# Patient Record
Sex: FEMALE | Race: WHITE | NOT HISPANIC OR LATINO
[De-identification: names, ages, dates, MRNs, and addresses within clinical notes are randomized per-mention and may not be internally consistent; named-entity substitution may affect disease eponyms.]

---

## 2017-01-28 ENCOUNTER — FORM ENCOUNTER (OUTPATIENT)
Age: 62
End: 2017-01-28

## 2017-01-29 ENCOUNTER — OUTPATIENT (OUTPATIENT)
Dept: OUTPATIENT SERVICES | Facility: HOSPITAL | Age: 62
LOS: 1 days | End: 2017-01-29
Payer: COMMERCIAL

## 2017-01-29 ENCOUNTER — APPOINTMENT (OUTPATIENT)
Dept: NUCLEAR MEDICINE | Facility: IMAGING CENTER | Age: 62
End: 2017-01-29

## 2017-01-29 DIAGNOSIS — C43.59 MALIGNANT MELANOMA OF OTHER PART OF TRUNK: ICD-10-CM

## 2017-01-29 PROCEDURE — A9552: CPT

## 2017-01-29 PROCEDURE — 78816 PET IMAGE W/CT FULL BODY: CPT

## 2017-02-22 ENCOUNTER — FORM ENCOUNTER (OUTPATIENT)
Age: 62
End: 2017-02-22

## 2017-02-23 ENCOUNTER — APPOINTMENT (OUTPATIENT)
Dept: NUCLEAR MEDICINE | Facility: IMAGING CENTER | Age: 62
End: 2017-02-23

## 2017-02-23 ENCOUNTER — OUTPATIENT (OUTPATIENT)
Dept: OUTPATIENT SERVICES | Facility: HOSPITAL | Age: 62
LOS: 1 days | End: 2017-02-23
Payer: COMMERCIAL

## 2017-02-23 DIAGNOSIS — C43.59 MALIGNANT MELANOMA OF OTHER PART OF TRUNK: ICD-10-CM

## 2017-02-23 PROCEDURE — 78306 BONE IMAGING WHOLE BODY: CPT

## 2017-02-23 PROCEDURE — 78999 UNLISTED MISC PX DX NUC MED: CPT

## 2017-02-23 PROCEDURE — A9561: CPT

## 2017-03-28 ENCOUNTER — CHART COPY (OUTPATIENT)
Age: 62
End: 2017-03-28

## 2017-03-28 ENCOUNTER — APPOINTMENT (OUTPATIENT)
Dept: PULMONOLOGY | Facility: CLINIC | Age: 62
End: 2017-03-28

## 2017-03-28 VITALS
HEIGHT: 70 IN | SYSTOLIC BLOOD PRESSURE: 160 MMHG | DIASTOLIC BLOOD PRESSURE: 90 MMHG | RESPIRATION RATE: 16 BRPM | WEIGHT: 157 LBS | TEMPERATURE: 98.6 F | BODY MASS INDEX: 22.48 KG/M2 | HEART RATE: 89 BPM

## 2017-03-29 LAB
ALBUMIN SERPL ELPH-MCNC: 4.5 G/DL
ALP BLD-CCNC: 90 U/L
ALT SERPL-CCNC: 13 U/L
ANION GAP SERPL CALC-SCNC: 19 MMOL/L
AST SERPL-CCNC: 26 U/L
BASOPHILS # BLD AUTO: 0.03 K/UL
BASOPHILS NFR BLD AUTO: 0.4 %
BILIRUB SERPL-MCNC: 0.4 MG/DL
BUN SERPL-MCNC: 16 MG/DL
CALCIUM SERPL-MCNC: 9.9 MG/DL
CHLORIDE SERPL-SCNC: 99 MMOL/L
CO2 SERPL-SCNC: 24 MMOL/L
CREAT SERPL-MCNC: 0.64 MG/DL
DEPRECATED KAPPA LC FREE/LAMBDA SER: 0.91 RATIO
EOSINOPHIL # BLD AUTO: 0.06 K/UL
EOSINOPHIL NFR BLD AUTO: 0.9 %
GLUCOSE SERPL-MCNC: 85 MG/DL
HCT VFR BLD CALC: 39.9 %
HGB BLD-MCNC: 12.8 G/DL
IGA SER QL IEP: 204 MG/DL
IGG SER QL IEP: 977 MG/DL
IGM SER QL IEP: 91 MG/DL
IMM GRANULOCYTES NFR BLD AUTO: 0.4 %
INR PPP: 0.9 RATIO
KAPPA LC CSF-MCNC: 1.37 MG/DL
KAPPA LC SERPL-MCNC: 1.25 MG/DL
LYMPHOCYTES # BLD AUTO: 1.19 K/UL
LYMPHOCYTES NFR BLD AUTO: 17 %
MAN DIFF?: NORMAL
MCHC RBC-ENTMCNC: 31.7 PG
MCHC RBC-ENTMCNC: 32.1 GM/DL
MCV RBC AUTO: 98.8 FL
MONOCYTES # BLD AUTO: 0.59 K/UL
MONOCYTES NFR BLD AUTO: 8.4 %
NEUTROPHILS # BLD AUTO: 5.11 K/UL
NEUTROPHILS NFR BLD AUTO: 72.9 %
PLATELET # BLD AUTO: 265 K/UL
POTASSIUM SERPL-SCNC: 4.3 MMOL/L
PROT SERPL-MCNC: 7.5 G/DL
PT BLD: 10.2 SEC
RBC # BLD: 4.04 M/UL
RBC # FLD: 12.4 %
SODIUM SERPL-SCNC: 142 MMOL/L
WBC # FLD AUTO: 7.01 K/UL

## 2017-03-30 LAB
IGG SUBSET TOTAL IGG: 1030 MG/DL
IGG1 SER-MCNC: 470 MG/DL
IGG2 SER-MCNC: 398 MG/DL
IGG3 SER-MCNC: 61.8 MG/DL
IGG4 SER-MCNC: 16.4 MG/DL

## 2017-04-11 ENCOUNTER — OUTPATIENT (OUTPATIENT)
Dept: OUTPATIENT SERVICES | Facility: HOSPITAL | Age: 62
LOS: 1 days | End: 2017-04-11
Payer: COMMERCIAL

## 2017-04-11 ENCOUNTER — APPOINTMENT (OUTPATIENT)
Dept: CT IMAGING | Facility: IMAGING CENTER | Age: 62
End: 2017-04-11

## 2017-04-11 DIAGNOSIS — R04.2 HEMOPTYSIS: ICD-10-CM

## 2017-04-11 DIAGNOSIS — A31.0 PULMONARY MYCOBACTERIAL INFECTION: ICD-10-CM

## 2017-04-11 PROCEDURE — 71260 CT THORAX DX C+: CPT

## 2017-05-02 ENCOUNTER — APPOINTMENT (OUTPATIENT)
Dept: PULMONOLOGY | Facility: CLINIC | Age: 62
End: 2017-05-02

## 2017-05-02 VITALS
BODY MASS INDEX: 22.48 KG/M2 | HEART RATE: 92 BPM | SYSTOLIC BLOOD PRESSURE: 150 MMHG | HEIGHT: 70 IN | TEMPERATURE: 97.9 F | RESPIRATION RATE: 16 BRPM | DIASTOLIC BLOOD PRESSURE: 90 MMHG | WEIGHT: 157 LBS | OXYGEN SATURATION: 98 %

## 2017-05-02 DIAGNOSIS — R04.2 HEMOPTYSIS: ICD-10-CM

## 2017-05-03 LAB
ALBUMIN SERPL ELPH-MCNC: 4.4 G/DL
ALP BLD-CCNC: 78 U/L
ALT SERPL-CCNC: 12 U/L
ANION GAP SERPL CALC-SCNC: 20 MMOL/L
APTT BLD: 30 SEC
AST SERPL-CCNC: 25 U/L
BASOPHILS # BLD AUTO: 0.02 K/UL
BASOPHILS NFR BLD AUTO: 0.3 %
BILIRUB SERPL-MCNC: 0.4 MG/DL
BUN SERPL-MCNC: 15 MG/DL
CALCIUM SERPL-MCNC: 9.9 MG/DL
CHLORIDE SERPL-SCNC: 102 MMOL/L
CO2 SERPL-SCNC: 21 MMOL/L
CREAT SERPL-MCNC: 0.73 MG/DL
EOSINOPHIL # BLD AUTO: 0.04 K/UL
EOSINOPHIL NFR BLD AUTO: 0.7 %
GLUCOSE SERPL-MCNC: 87 MG/DL
HCT VFR BLD CALC: 38.8 %
HGB BLD-MCNC: 12.6 G/DL
IMM GRANULOCYTES NFR BLD AUTO: 0.2 %
INR PPP: 0.91 RATIO
LYMPHOCYTES # BLD AUTO: 1.31 K/UL
LYMPHOCYTES NFR BLD AUTO: 21.4 %
MAN DIFF?: NORMAL
MCHC RBC-ENTMCNC: 32 PG
MCHC RBC-ENTMCNC: 32.5 GM/DL
MCV RBC AUTO: 98.5 FL
MONOCYTES # BLD AUTO: 0.63 K/UL
MONOCYTES NFR BLD AUTO: 10.3 %
NEUTROPHILS # BLD AUTO: 4.12 K/UL
NEUTROPHILS NFR BLD AUTO: 67.1 %
PLATELET # BLD AUTO: 250 K/UL
POTASSIUM SERPL-SCNC: 4.3 MMOL/L
PROT SERPL-MCNC: 7.4 G/DL
PT BLD: 10.3 SEC
RBC # BLD: 3.94 M/UL
RBC # FLD: 12.5 %
SODIUM SERPL-SCNC: 143 MMOL/L
WBC # FLD AUTO: 6.13 K/UL

## 2017-05-19 ENCOUNTER — APPOINTMENT (OUTPATIENT)
Dept: PULMONOLOGY | Facility: HOSPITAL | Age: 62
End: 2017-05-19

## 2017-05-19 ENCOUNTER — RESULT REVIEW (OUTPATIENT)
Age: 62
End: 2017-05-19

## 2017-05-19 ENCOUNTER — OUTPATIENT (OUTPATIENT)
Dept: OUTPATIENT SERVICES | Facility: HOSPITAL | Age: 62
LOS: 1 days | End: 2017-05-19
Payer: COMMERCIAL

## 2017-05-19 DIAGNOSIS — R04.2 HEMOPTYSIS: ICD-10-CM

## 2017-05-19 LAB
B PERT IGG+IGM PNL SER: ABNORMAL
COLOR FLD: SIGNIFICANT CHANGE UP
FLUID INTAKE SUBSTANCE CLASS: SIGNIFICANT CHANGE UP
FLUID SEGMENTED GRANULOCYTES: 36 % — SIGNIFICANT CHANGE UP
LYMPHOCYTES # FLD: 13 % — SIGNIFICANT CHANGE UP
MESOTHL CELL # FLD: 3 % — SIGNIFICANT CHANGE UP
MONOS+MACROS # FLD: 48 % — SIGNIFICANT CHANGE UP
NIGHT BLUE STAIN TISS: SIGNIFICANT CHANGE UP
RCV VOL RI: 60 /UL — HIGH (ref 0–5)
SPECIMEN SOURCE: SIGNIFICANT CHANGE UP
TOTAL NUCLEATED CELL COUNT, BODY FLUID: 187 /UL — HIGH (ref 0–5)
TUBE TYPE: SIGNIFICANT CHANGE UP

## 2017-05-19 PROCEDURE — 89051 BODY FLUID CELL COUNT: CPT

## 2017-05-19 PROCEDURE — 87206 SMEAR FLUORESCENT/ACID STAI: CPT

## 2017-05-19 PROCEDURE — 87070 CULTURE OTHR SPECIMN AEROBIC: CPT

## 2017-05-19 PROCEDURE — 87015 SPECIMEN INFECT AGNT CONCNTJ: CPT

## 2017-05-19 PROCEDURE — 87102 FUNGUS ISOLATION CULTURE: CPT

## 2017-05-19 PROCEDURE — 87798 DETECT AGENT NOS DNA AMP: CPT

## 2017-05-19 PROCEDURE — 31624 DX BRONCHOSCOPE/LAVAGE: CPT | Mod: RT

## 2017-05-19 PROCEDURE — 87116 MYCOBACTERIA CULTURE: CPT

## 2017-05-19 PROCEDURE — 88112 CYTOPATH CELL ENHANCE TECH: CPT

## 2017-05-19 PROCEDURE — 31624 DX BRONCHOSCOPE/LAVAGE: CPT | Mod: GC

## 2017-05-19 PROCEDURE — 88112 CYTOPATH CELL ENHANCE TECH: CPT | Mod: 26

## 2017-05-19 PROCEDURE — 88312 SPECIAL STAINS GROUP 1: CPT

## 2017-05-19 PROCEDURE — 88312 SPECIAL STAINS GROUP 1: CPT | Mod: 26

## 2017-05-19 PROCEDURE — 87556 M.TUBERCULO DNA AMP PROBE: CPT

## 2017-05-20 LAB
DEPRECATED M TB RPOB XXX QL NAA+PROBE: SIGNIFICANT CHANGE UP
GRAM STN FLD: SIGNIFICANT CHANGE UP
M TB CMPLX DNA SPT/BRO QL NAA+PROBE: SIGNIFICANT CHANGE UP
M TB CMPLX DNA SPT/BRO QL NAA+PROBE: SIGNIFICANT CHANGE UP
MTB RIF RES GENE SPT NAA+PROBE: SIGNIFICANT CHANGE UP
RIFAMPIN PCR INTERP: SIGNIFICANT CHANGE UP
SPECIMEN SOURCE: SIGNIFICANT CHANGE UP

## 2017-05-21 LAB
CULTURE RESULTS: SIGNIFICANT CHANGE UP
SPECIMEN SOURCE: SIGNIFICANT CHANGE UP

## 2017-05-22 LAB — NON-GYNECOLOGICAL CYTOLOGY STUDY: SIGNIFICANT CHANGE UP

## 2017-05-25 ENCOUNTER — CHART COPY (OUTPATIENT)
Age: 62
End: 2017-05-25

## 2017-06-17 LAB
CULTURE RESULTS: SIGNIFICANT CHANGE UP
SPECIMEN SOURCE: SIGNIFICANT CHANGE UP

## 2017-07-05 LAB
CULTURE RESULTS: SIGNIFICANT CHANGE UP
SPECIMEN SOURCE: SIGNIFICANT CHANGE UP

## 2017-07-13 ENCOUNTER — APPOINTMENT (OUTPATIENT)
Dept: SURGICAL ONCOLOGY | Facility: CLINIC | Age: 62
End: 2017-07-13

## 2017-07-13 VITALS
HEART RATE: 101 BPM | HEIGHT: 70 IN | RESPIRATION RATE: 15 BRPM | WEIGHT: 157 LBS | SYSTOLIC BLOOD PRESSURE: 209 MMHG | DIASTOLIC BLOOD PRESSURE: 101 MMHG | BODY MASS INDEX: 22.48 KG/M2

## 2017-07-13 DIAGNOSIS — R59.9 ENLARGED LYMPH NODES, UNSPECIFIED: ICD-10-CM

## 2017-07-13 DIAGNOSIS — H53.2 DIPLOPIA: ICD-10-CM

## 2017-09-14 ENCOUNTER — APPOINTMENT (OUTPATIENT)
Dept: INFECTIOUS DISEASE | Facility: CLINIC | Age: 62
End: 2017-09-14
Payer: COMMERCIAL

## 2017-09-14 VITALS
TEMPERATURE: 97.9 F | BODY MASS INDEX: 23.77 KG/M2 | SYSTOLIC BLOOD PRESSURE: 171 MMHG | HEART RATE: 95 BPM | DIASTOLIC BLOOD PRESSURE: 76 MMHG | HEIGHT: 70 IN | WEIGHT: 166 LBS | OXYGEN SATURATION: 98 %

## 2017-09-14 DIAGNOSIS — I89.0 LYMPHEDEMA, NOT ELSEWHERE CLASSIFIED: ICD-10-CM

## 2017-09-14 PROCEDURE — 99215 OFFICE O/P EST HI 40 MIN: CPT

## 2017-09-14 RX ORDER — AMOXICILLIN AND CLAVULANATE POTASSIUM 875; 125 MG/1; MG/1
875-125 TABLET, COATED ORAL
Qty: 20 | Refills: 0 | Status: DISCONTINUED | COMMUNITY
Start: 2017-05-02 | End: 2017-09-14

## 2017-09-14 RX ORDER — ACETAMINOPHEN 325 MG/1
325 TABLET, FILM COATED ORAL
Refills: 0 | Status: ACTIVE | COMMUNITY
Start: 2017-09-14

## 2017-10-09 PROBLEM — H53.2 DIPLOPIA: Status: ACTIVE | Noted: 2017-10-09

## 2017-10-18 ENCOUNTER — FORM ENCOUNTER (OUTPATIENT)
Age: 62
End: 2017-10-18

## 2017-10-19 ENCOUNTER — APPOINTMENT (OUTPATIENT)
Dept: MRI IMAGING | Facility: IMAGING CENTER | Age: 62
End: 2017-10-19
Payer: COMMERCIAL

## 2017-10-19 ENCOUNTER — OUTPATIENT (OUTPATIENT)
Dept: OUTPATIENT SERVICES | Facility: HOSPITAL | Age: 62
LOS: 1 days | End: 2017-10-19
Payer: COMMERCIAL

## 2017-10-19 DIAGNOSIS — H53.2 DIPLOPIA: ICD-10-CM

## 2017-10-19 PROCEDURE — 70543 MRI ORBT/FAC/NCK W/O &W/DYE: CPT | Mod: 26

## 2017-10-19 PROCEDURE — 70553 MRI BRAIN STEM W/O & W/DYE: CPT | Mod: 26

## 2017-10-19 PROCEDURE — 70543 MRI ORBT/FAC/NCK W/O &W/DYE: CPT

## 2017-10-19 PROCEDURE — A9585: CPT

## 2017-10-19 PROCEDURE — 70553 MRI BRAIN STEM W/O & W/DYE: CPT

## 2017-10-19 PROCEDURE — 82565 ASSAY OF CREATININE: CPT

## 2017-11-03 ENCOUNTER — APPOINTMENT (OUTPATIENT)
Dept: PLASTIC SURGERY | Facility: CLINIC | Age: 62
End: 2017-11-03

## 2017-11-08 ENCOUNTER — FORM ENCOUNTER (OUTPATIENT)
Age: 62
End: 2017-11-08

## 2017-11-09 ENCOUNTER — OUTPATIENT (OUTPATIENT)
Dept: OUTPATIENT SERVICES | Facility: HOSPITAL | Age: 62
LOS: 1 days | End: 2017-11-09
Payer: COMMERCIAL

## 2017-11-09 ENCOUNTER — APPOINTMENT (OUTPATIENT)
Dept: ULTRASOUND IMAGING | Facility: IMAGING CENTER | Age: 62
End: 2017-11-09
Payer: COMMERCIAL

## 2017-11-09 DIAGNOSIS — R59.1 GENERALIZED ENLARGED LYMPH NODES: ICD-10-CM

## 2017-11-09 PROCEDURE — 76536 US EXAM OF HEAD AND NECK: CPT

## 2017-11-09 PROCEDURE — 76536 US EXAM OF HEAD AND NECK: CPT | Mod: 26

## 2017-11-15 ENCOUNTER — FORM ENCOUNTER (OUTPATIENT)
Age: 62
End: 2017-11-15

## 2017-11-16 ENCOUNTER — APPOINTMENT (OUTPATIENT)
Dept: MAMMOGRAPHY | Facility: IMAGING CENTER | Age: 62
End: 2017-11-16
Payer: COMMERCIAL

## 2017-11-16 ENCOUNTER — APPOINTMENT (OUTPATIENT)
Dept: ULTRASOUND IMAGING | Facility: IMAGING CENTER | Age: 62
End: 2017-11-16
Payer: COMMERCIAL

## 2017-11-16 ENCOUNTER — OUTPATIENT (OUTPATIENT)
Dept: OUTPATIENT SERVICES | Facility: HOSPITAL | Age: 62
LOS: 1 days | End: 2017-11-16
Payer: COMMERCIAL

## 2017-11-16 DIAGNOSIS — C43.59 MALIGNANT MELANOMA OF OTHER PART OF TRUNK: ICD-10-CM

## 2017-11-16 DIAGNOSIS — R22.1 LOCALIZED SWELLING, MASS AND LUMP, NECK: ICD-10-CM

## 2017-11-16 PROCEDURE — G0204: CPT | Mod: 26

## 2017-11-16 PROCEDURE — G0279: CPT

## 2017-11-16 PROCEDURE — 76641 ULTRASOUND BREAST COMPLETE: CPT

## 2017-11-16 PROCEDURE — 77066 DX MAMMO INCL CAD BI: CPT

## 2017-11-16 PROCEDURE — G0279: CPT | Mod: 26

## 2017-11-16 PROCEDURE — 76641 ULTRASOUND BREAST COMPLETE: CPT | Mod: 26,50

## 2018-01-11 ENCOUNTER — APPOINTMENT (OUTPATIENT)
Dept: SURGICAL ONCOLOGY | Facility: CLINIC | Age: 63
End: 2018-01-11
Payer: COMMERCIAL

## 2018-01-11 VITALS
HEIGHT: 70 IN | WEIGHT: 165 LBS | OXYGEN SATURATION: 100 % | DIASTOLIC BLOOD PRESSURE: 93 MMHG | BODY MASS INDEX: 23.62 KG/M2 | TEMPERATURE: 98.2 F | HEART RATE: 72 BPM | SYSTOLIC BLOOD PRESSURE: 202 MMHG

## 2018-01-11 PROCEDURE — 99213 OFFICE O/P EST LOW 20 MIN: CPT

## 2018-01-20 ENCOUNTER — FORM ENCOUNTER (OUTPATIENT)
Age: 63
End: 2018-01-20

## 2018-01-21 ENCOUNTER — APPOINTMENT (OUTPATIENT)
Dept: CT IMAGING | Facility: IMAGING CENTER | Age: 63
End: 2018-01-21
Payer: COMMERCIAL

## 2018-01-21 ENCOUNTER — APPOINTMENT (OUTPATIENT)
Dept: NUCLEAR MEDICINE | Facility: IMAGING CENTER | Age: 63
End: 2018-01-21

## 2018-01-21 ENCOUNTER — OUTPATIENT (OUTPATIENT)
Dept: OUTPATIENT SERVICES | Facility: HOSPITAL | Age: 63
LOS: 1 days | End: 2018-01-21
Payer: COMMERCIAL

## 2018-01-21 DIAGNOSIS — C43.59 MALIGNANT MELANOMA OF OTHER PART OF TRUNK: ICD-10-CM

## 2018-01-21 PROCEDURE — 74177 CT ABD & PELVIS W/CONTRAST: CPT

## 2018-01-21 PROCEDURE — 74177 CT ABD & PELVIS W/CONTRAST: CPT | Mod: 26

## 2018-01-21 PROCEDURE — 71260 CT THORAX DX C+: CPT

## 2018-01-21 PROCEDURE — 82565 ASSAY OF CREATININE: CPT

## 2018-01-21 PROCEDURE — 71260 CT THORAX DX C+: CPT | Mod: 26

## 2018-03-09 ENCOUNTER — OUTPATIENT (OUTPATIENT)
Dept: OUTPATIENT SERVICES | Facility: HOSPITAL | Age: 63
LOS: 1 days | End: 2018-03-09
Payer: COMMERCIAL

## 2018-03-09 VITALS
DIASTOLIC BLOOD PRESSURE: 85 MMHG | TEMPERATURE: 98 F | HEIGHT: 70 IN | OXYGEN SATURATION: 98 % | WEIGHT: 173.06 LBS | HEART RATE: 89 BPM | RESPIRATION RATE: 16 BRPM | SYSTOLIC BLOOD PRESSURE: 145 MMHG

## 2018-03-09 DIAGNOSIS — Z98.890 OTHER SPECIFIED POSTPROCEDURAL STATES: Chronic | ICD-10-CM

## 2018-03-09 DIAGNOSIS — I87.2 VENOUS INSUFFICIENCY (CHRONIC) (PERIPHERAL): ICD-10-CM

## 2018-03-09 DIAGNOSIS — Z85.820 PERSONAL HISTORY OF MALIGNANT MELANOMA OF SKIN: ICD-10-CM

## 2018-03-09 DIAGNOSIS — M79.602 PAIN IN LEFT ARM: ICD-10-CM

## 2018-03-09 DIAGNOSIS — I89.9 NONINFECTIVE DISORDER OF LYMPHATIC VESSELS AND LYMPH NODES, UNSPECIFIED: Chronic | ICD-10-CM

## 2018-03-09 DIAGNOSIS — Z01.818 ENCOUNTER FOR OTHER PREPROCEDURAL EXAMINATION: ICD-10-CM

## 2018-03-09 DIAGNOSIS — Z87.2 PERSONAL HISTORY OF DISEASES OF THE SKIN AND SUBCUTANEOUS TISSUE: Chronic | ICD-10-CM

## 2018-03-09 LAB
ANION GAP SERPL CALC-SCNC: 13 MMOL/L — SIGNIFICANT CHANGE UP (ref 5–17)
BLD GP AB SCN SERPL QL: POSITIVE — SIGNIFICANT CHANGE UP
BUN SERPL-MCNC: 13 MG/DL — SIGNIFICANT CHANGE UP (ref 7–23)
CALCIUM SERPL-MCNC: 9.7 MG/DL — SIGNIFICANT CHANGE UP (ref 8.4–10.5)
CHLORIDE SERPL-SCNC: 103 MMOL/L — SIGNIFICANT CHANGE UP (ref 96–108)
CO2 SERPL-SCNC: 25 MMOL/L — SIGNIFICANT CHANGE UP (ref 22–31)
CREAT SERPL-MCNC: 0.59 MG/DL — SIGNIFICANT CHANGE UP (ref 0.5–1.3)
GLUCOSE SERPL-MCNC: 97 MG/DL — SIGNIFICANT CHANGE UP (ref 70–99)
HCT VFR BLD CALC: 43.8 % — SIGNIFICANT CHANGE UP (ref 34.5–45)
HGB BLD-MCNC: 14.1 G/DL — SIGNIFICANT CHANGE UP (ref 11.5–15.5)
MCHC RBC-ENTMCNC: 32.2 GM/DL — SIGNIFICANT CHANGE UP (ref 32–36)
MCHC RBC-ENTMCNC: 32.2 PG — SIGNIFICANT CHANGE UP (ref 27–34)
MCV RBC AUTO: 100 FL — SIGNIFICANT CHANGE UP (ref 80–100)
PLATELET # BLD AUTO: 262 K/UL — SIGNIFICANT CHANGE UP (ref 150–400)
POTASSIUM SERPL-MCNC: 4.2 MMOL/L — SIGNIFICANT CHANGE UP (ref 3.5–5.3)
POTASSIUM SERPL-SCNC: 4.2 MMOL/L — SIGNIFICANT CHANGE UP (ref 3.5–5.3)
RBC # BLD: 4.38 M/UL — SIGNIFICANT CHANGE UP (ref 3.8–5.2)
RBC # FLD: 12.5 % — SIGNIFICANT CHANGE UP (ref 10.3–14.5)
RH IG SCN BLD-IMP: POSITIVE — SIGNIFICANT CHANGE UP
SODIUM SERPL-SCNC: 141 MMOL/L — SIGNIFICANT CHANGE UP (ref 135–145)
WBC # BLD: 5.77 K/UL — SIGNIFICANT CHANGE UP (ref 3.8–10.5)
WBC # FLD AUTO: 5.77 K/UL — SIGNIFICANT CHANGE UP (ref 3.8–10.5)

## 2018-03-09 PROCEDURE — 86850 RBC ANTIBODY SCREEN: CPT

## 2018-03-09 PROCEDURE — 80048 BASIC METABOLIC PNL TOTAL CA: CPT

## 2018-03-09 PROCEDURE — 86900 BLOOD TYPING SEROLOGIC ABO: CPT

## 2018-03-09 PROCEDURE — G0463: CPT

## 2018-03-09 PROCEDURE — 85027 COMPLETE CBC AUTOMATED: CPT

## 2018-03-09 PROCEDURE — 86870 RBC ANTIBODY IDENTIFICATION: CPT

## 2018-03-09 PROCEDURE — 86901 BLOOD TYPING SEROLOGIC RH(D): CPT

## 2018-03-09 NOTE — H&P PST ADULT - MUSCULOSKELETAL COMMENTS
hx left arm lymphedema post lymph node excision 2010 wears left arm antiembolic  arm stocking with good circulation with good  ROM

## 2018-03-09 NOTE — H&P PST ADULT - PROBLEM SELECTOR PLAN 1
Left arm lymphedema ,free fascial flap with microvascular anastomosis  CBC/BMP and type and screen drawn pending result   PST instruction with antibacterial soap given 3 days preop for prevention of post op infection  Medical evaluation on chart from PMD

## 2018-03-09 NOTE — H&P PST ADULT - PMH
Lymphedema of arm  left 2010 post lymph node excision left axilla  Melanoma  left side of chest 2010 /excision of lesion and lymph node excision with immunotherapy  2010  White coat syndrome with hypertension  stable not taking any medication Lymphedema of arm  left 2010 post lymph node excision left axilla  Melanoma  left side of chest 2010 /excision of lesion and lymph node excision with immunotherapy  2010

## 2018-03-09 NOTE — H&P PST ADULT - ENERGY EXPENDITURE (METS)
dasi score 8 limited  movement on left milena due to  lymphedema dasi score 7 limited  movement on left milena due to  lymphedema

## 2018-03-09 NOTE — H&P PST ADULT - NSANTHOSAYNRD_GEN_A_CORE
No. AMERICO screening performed.  STOP BANG Legend: 0-2 = LOW Risk; 3-4 = INTERMEDIATE Risk; 5-8 = HIGH Risk

## 2018-03-09 NOTE — H&P PST ADULT - HISTORY OF PRESENT ILLNESS
63 y/o female with history of left chest wall melanoma 2010 with excision of lesion and lymph excision on left arm with resulted to lymphedema. Patient  was evaluated by PMD and was referred to Dr Longo , evaluated and indicated this procedure for treatment.

## 2018-03-10 ENCOUNTER — OUTPATIENT (OUTPATIENT)
Dept: OUTPATIENT SERVICES | Facility: HOSPITAL | Age: 63
LOS: 1 days | End: 2018-03-10
Payer: COMMERCIAL

## 2018-03-10 DIAGNOSIS — Z87.2 PERSONAL HISTORY OF DISEASES OF THE SKIN AND SUBCUTANEOUS TISSUE: Chronic | ICD-10-CM

## 2018-03-10 DIAGNOSIS — Z98.890 OTHER SPECIFIED POSTPROCEDURAL STATES: Chronic | ICD-10-CM

## 2018-03-10 DIAGNOSIS — I89.9 NONINFECTIVE DISORDER OF LYMPHATIC VESSELS AND LYMPH NODES, UNSPECIFIED: Chronic | ICD-10-CM

## 2018-03-10 LAB
ANTIBODY ID 1_1: SIGNIFICANT CHANGE UP
DAT POLY-SP REAG RBC QL: NEGATIVE — SIGNIFICANT CHANGE UP

## 2018-03-10 PROCEDURE — 86880 COOMBS TEST DIRECT: CPT

## 2018-03-10 PROCEDURE — 86077 PHYS BLOOD BANK SERV XMATCH: CPT

## 2018-03-10 PROCEDURE — 86905 BLOOD TYPING RBC ANTIGENS: CPT

## 2018-03-15 ENCOUNTER — TRANSCRIPTION ENCOUNTER (OUTPATIENT)
Age: 63
End: 2018-03-15

## 2018-03-15 ENCOUNTER — APPOINTMENT (OUTPATIENT)
Dept: INFECTIOUS DISEASE | Facility: CLINIC | Age: 63
End: 2018-03-15

## 2018-03-16 ENCOUNTER — APPOINTMENT (OUTPATIENT)
Dept: NUCLEAR MEDICINE | Facility: HOSPITAL | Age: 63
End: 2018-03-16

## 2018-03-16 ENCOUNTER — OUTPATIENT (OUTPATIENT)
Dept: OUTPATIENT SERVICES | Facility: HOSPITAL | Age: 63
LOS: 1 days | End: 2018-03-16
Payer: COMMERCIAL

## 2018-03-16 VITALS
TEMPERATURE: 98 F | WEIGHT: 173.06 LBS | RESPIRATION RATE: 18 BRPM | SYSTOLIC BLOOD PRESSURE: 192 MMHG | DIASTOLIC BLOOD PRESSURE: 90 MMHG | HEIGHT: 70 IN | OXYGEN SATURATION: 98 % | HEART RATE: 96 BPM

## 2018-03-16 DIAGNOSIS — Z85.820 PERSONAL HISTORY OF MALIGNANT MELANOMA OF SKIN: ICD-10-CM

## 2018-03-16 DIAGNOSIS — Z98.890 OTHER SPECIFIED POSTPROCEDURAL STATES: Chronic | ICD-10-CM

## 2018-03-16 DIAGNOSIS — Z87.2 PERSONAL HISTORY OF DISEASES OF THE SKIN AND SUBCUTANEOUS TISSUE: Chronic | ICD-10-CM

## 2018-03-16 DIAGNOSIS — I87.2 VENOUS INSUFFICIENCY (CHRONIC) (PERIPHERAL): ICD-10-CM

## 2018-03-16 DIAGNOSIS — M79.602 PAIN IN LEFT ARM: ICD-10-CM

## 2018-03-16 DIAGNOSIS — I89.9 NONINFECTIVE DISORDER OF LYMPHATIC VESSELS AND LYMPH NODES, UNSPECIFIED: Chronic | ICD-10-CM

## 2018-03-16 LAB
BLD GP AB SCN SERPL QL: POSITIVE — SIGNIFICANT CHANGE UP
RH IG SCN BLD-IMP: POSITIVE — SIGNIFICANT CHANGE UP

## 2018-03-16 PROCEDURE — 78195 LYMPH SYSTEM IMAGING: CPT | Mod: 26

## 2018-03-16 RX ORDER — ACETAMINOPHEN 500 MG
650 TABLET ORAL EVERY 6 HOURS
Qty: 0 | Refills: 0 | Status: DISCONTINUED | OUTPATIENT
Start: 2018-03-16 | End: 2018-03-31

## 2018-03-16 RX ORDER — ASPIRIN/CALCIUM CARB/MAGNESIUM 324 MG
1 TABLET ORAL
Qty: 0 | Refills: 0 | DISCHARGE
Start: 2018-03-16

## 2018-03-16 RX ORDER — FAMOTIDINE 10 MG/ML
20 INJECTION INTRAVENOUS ONCE
Qty: 0 | Refills: 0 | Status: COMPLETED | OUTPATIENT
Start: 2018-03-16 | End: 2018-03-16

## 2018-03-16 RX ORDER — CEFAZOLIN SODIUM 1 G
1000 VIAL (EA) INJECTION EVERY 8 HOURS
Qty: 0 | Refills: 0 | Status: DISCONTINUED | OUTPATIENT
Start: 2018-03-16 | End: 2018-03-31

## 2018-03-16 RX ORDER — HYDROMORPHONE HYDROCHLORIDE 2 MG/ML
0.5 INJECTION INTRAMUSCULAR; INTRAVENOUS; SUBCUTANEOUS
Qty: 0 | Refills: 0 | Status: DISCONTINUED | OUTPATIENT
Start: 2018-03-16 | End: 2018-03-16

## 2018-03-16 RX ORDER — DIPHENHYDRAMINE HCL 50 MG
25 CAPSULE ORAL EVERY 6 HOURS
Qty: 0 | Refills: 0 | Status: DISCONTINUED | OUTPATIENT
Start: 2018-03-16 | End: 2018-03-31

## 2018-03-16 RX ORDER — SODIUM CHLORIDE 9 MG/ML
1000 INJECTION, SOLUTION INTRAVENOUS
Qty: 0 | Refills: 0 | Status: DISCONTINUED | OUTPATIENT
Start: 2018-03-16 | End: 2018-03-17

## 2018-03-16 RX ORDER — METOPROLOL TARTRATE 50 MG
5 TABLET ORAL EVERY 6 HOURS
Qty: 0 | Refills: 0 | Status: DISCONTINUED | OUTPATIENT
Start: 2018-03-16 | End: 2018-03-31

## 2018-03-16 RX ORDER — OXYCODONE HYDROCHLORIDE 5 MG/1
10 TABLET ORAL EVERY 4 HOURS
Qty: 0 | Refills: 0 | Status: DISCONTINUED | OUTPATIENT
Start: 2018-03-16 | End: 2018-03-16

## 2018-03-16 RX ORDER — DOCUSATE SODIUM 100 MG
100 CAPSULE ORAL THREE TIMES A DAY
Qty: 0 | Refills: 0 | Status: DISCONTINUED | OUTPATIENT
Start: 2018-03-16 | End: 2018-03-31

## 2018-03-16 RX ORDER — ASPIRIN/CALCIUM CARB/MAGNESIUM 324 MG
325 TABLET ORAL DAILY
Qty: 0 | Refills: 0 | Status: DISCONTINUED | OUTPATIENT
Start: 2018-03-16 | End: 2018-03-31

## 2018-03-16 RX ORDER — LIDOCAINE HCL 20 MG/ML
0.2 VIAL (ML) INJECTION ONCE
Qty: 0 | Refills: 0 | Status: DISCONTINUED | OUTPATIENT
Start: 2018-03-16 | End: 2018-03-16

## 2018-03-16 RX ORDER — SODIUM CHLORIDE 9 MG/ML
1000 INJECTION, SOLUTION INTRAVENOUS ONCE
Qty: 0 | Refills: 0 | Status: DISCONTINUED | OUTPATIENT
Start: 2018-03-16 | End: 2018-03-17

## 2018-03-16 RX ORDER — OXYCODONE HYDROCHLORIDE 5 MG/1
5 TABLET ORAL EVERY 4 HOURS
Qty: 0 | Refills: 0 | Status: DISCONTINUED | OUTPATIENT
Start: 2018-03-16 | End: 2018-03-17

## 2018-03-16 RX ORDER — ONDANSETRON 8 MG/1
4 TABLET, FILM COATED ORAL ONCE
Qty: 0 | Refills: 0 | Status: COMPLETED | OUTPATIENT
Start: 2018-03-16 | End: 2018-03-16

## 2018-03-16 RX ORDER — SODIUM CHLORIDE 9 MG/ML
1000 INJECTION, SOLUTION INTRAVENOUS
Qty: 0 | Refills: 0 | Status: DISCONTINUED | OUTPATIENT
Start: 2018-03-16 | End: 2018-03-16

## 2018-03-16 RX ORDER — ONDANSETRON 8 MG/1
4 TABLET, FILM COATED ORAL EVERY 8 HOURS
Qty: 0 | Refills: 0 | Status: DISCONTINUED | OUTPATIENT
Start: 2018-03-16 | End: 2018-03-31

## 2018-03-16 RX ORDER — SENNA PLUS 8.6 MG/1
2 TABLET ORAL AT BEDTIME
Qty: 0 | Refills: 0 | Status: DISCONTINUED | OUTPATIENT
Start: 2018-03-16 | End: 2018-03-31

## 2018-03-16 RX ORDER — ENOXAPARIN SODIUM 100 MG/ML
40 INJECTION SUBCUTANEOUS EVERY 24 HOURS
Qty: 0 | Refills: 0 | Status: DISCONTINUED | OUTPATIENT
Start: 2018-03-16 | End: 2018-03-31

## 2018-03-16 RX ORDER — DIPHENHYDRAMINE HCL 50 MG
12.5 CAPSULE ORAL ONCE
Qty: 0 | Refills: 0 | Status: COMPLETED | OUTPATIENT
Start: 2018-03-16 | End: 2018-03-16

## 2018-03-16 RX ORDER — SODIUM CHLORIDE 9 MG/ML
3 INJECTION INTRAMUSCULAR; INTRAVENOUS; SUBCUTANEOUS EVERY 8 HOURS
Qty: 0 | Refills: 0 | Status: DISCONTINUED | OUTPATIENT
Start: 2018-03-16 | End: 2018-03-16

## 2018-03-16 RX ADMIN — Medication 12.5 MILLIGRAM(S): at 18:45

## 2018-03-16 RX ADMIN — HYDROMORPHONE HYDROCHLORIDE 0.5 MILLIGRAM(S): 2 INJECTION INTRAMUSCULAR; INTRAVENOUS; SUBCUTANEOUS at 17:15

## 2018-03-16 RX ADMIN — HYDROMORPHONE HYDROCHLORIDE 0.5 MILLIGRAM(S): 2 INJECTION INTRAMUSCULAR; INTRAVENOUS; SUBCUTANEOUS at 20:30

## 2018-03-16 RX ADMIN — Medication 650 MILLIGRAM(S): at 22:55

## 2018-03-16 RX ADMIN — ONDANSETRON 4 MILLIGRAM(S): 8 TABLET, FILM COATED ORAL at 22:54

## 2018-03-16 RX ADMIN — HYDROMORPHONE HYDROCHLORIDE 0.5 MILLIGRAM(S): 2 INJECTION INTRAMUSCULAR; INTRAVENOUS; SUBCUTANEOUS at 17:30

## 2018-03-16 RX ADMIN — OXYCODONE HYDROCHLORIDE 5 MILLIGRAM(S): 5 TABLET ORAL at 23:24

## 2018-03-16 RX ADMIN — SODIUM CHLORIDE 75 MILLILITER(S): 9 INJECTION, SOLUTION INTRAVENOUS at 22:55

## 2018-03-16 RX ADMIN — OXYCODONE HYDROCHLORIDE 5 MILLIGRAM(S): 5 TABLET ORAL at 22:55

## 2018-03-16 RX ADMIN — HYDROMORPHONE HYDROCHLORIDE 0.5 MILLIGRAM(S): 2 INJECTION INTRAMUSCULAR; INTRAVENOUS; SUBCUTANEOUS at 20:46

## 2018-03-16 RX ADMIN — FAMOTIDINE 20 MILLIGRAM(S): 10 INJECTION INTRAVENOUS at 18:45

## 2018-03-16 RX ADMIN — Medication 100 MILLIGRAM(S): at 21:55

## 2018-03-16 RX ADMIN — ONDANSETRON 4 MILLIGRAM(S): 8 TABLET, FILM COATED ORAL at 16:45

## 2018-03-16 RX ADMIN — HYDROMORPHONE HYDROCHLORIDE 0.5 MILLIGRAM(S): 2 INJECTION INTRAMUSCULAR; INTRAVENOUS; SUBCUTANEOUS at 17:00

## 2018-03-16 RX ADMIN — OXYCODONE HYDROCHLORIDE 5 MILLIGRAM(S): 5 TABLET ORAL at 23:34

## 2018-03-16 RX ADMIN — HYDROMORPHONE HYDROCHLORIDE 0.5 MILLIGRAM(S): 2 INJECTION INTRAMUSCULAR; INTRAVENOUS; SUBCUTANEOUS at 16:45

## 2018-03-16 RX ADMIN — Medication 650 MILLIGRAM(S): at 23:24

## 2018-03-16 NOTE — DISCHARGE NOTE ADULT - CARE PROVIDER_API CALL
Jacob Longo), Plastic Surgery  [address] [place] [address] [id number]  Phone: [phone]  Fax: [phone]

## 2018-03-16 NOTE — BRIEF OPERATIVE NOTE - OPERATION/FINDINGS
Left arm lymphedema s/p vascularized lymph node transfer from Right groin to Left axilla, intraoperative ICG lymphscintigraphy

## 2018-03-16 NOTE — DISCHARGE NOTE ADULT - PATIENT PORTAL LINK FT
You can access the [facility], offered by [facility], by registering with the following website: [email or web address]

## 2018-03-16 NOTE — DISCHARGE NOTE ADULT - MEDICATION SUMMARY - MEDICATIONS TO TAKE
I will START or STAY ON the medications listed below when I get home from the hospital:    aspirin 325 mg oral tablet  -- 1 tab(s) by mouth once a day  -- Indication: For Continue until follow-up    Percocet 5/325  -- 1 tab(s) by mouth every 6 hours, As Needed  -- Indication: For Severe pain    Keflex 500 mg oral capsule  -- 1 tab(s) by mouth every 6 hours  -- Indication: For Antibiotics I will START or STAY ON the medications listed below when I get home from the hospital:    aspirin 325 mg oral tablet  -- 1 tab(s) by mouth once a day  -- Indication: For Continue until follow-up    Percocet 5/325 oral tablet  -- 1 tab(s) by mouth every 6 hours, As Needed -for severe pain MDD:4 tabs   -- Caution federal law prohibits the transfer of this drug to any person other  than the person for whom it was prescribed.  May cause drowsiness.  Alcohol may intensify this effect.  Use care when operating dangerous machinery.  This prescription cannot be refilled.  This product contains acetaminophen.  Do not use  with any other product containing acetaminophen to prevent possible liver damage.  Using more of this medication than prescribed may cause serious breathing problems.    -- Indication: For For pain    Keflex 500 mg oral capsule  -- 1 tab(s) by mouth every 6 hours  -- Indication: For Antibiotics

## 2018-03-16 NOTE — DISCHARGE NOTE ADULT - PLAN OF CARE
s/p vascularized lymph node transfer from Right groin to Left axilla Take medications as prescribed by the [facility] office.  Do not lift Left arm above shoulder level  You may shower after 48 hrs  Empty and record HERNANDO drains daily  Follow up next week--call for an appointment

## 2018-03-16 NOTE — DISCHARGE NOTE ADULT - CARE PLAN
Principal Discharge DX:	Lymphedema of arm  Goal:	s/p vascularized lymph node transfer from Right groin to Left axilla  Assessment and plan of treatment:	Take medications as prescribed by the [facility] office.  Do not lift Left arm above shoulder level  You may shower after 48 hrs  Empty and record HERNANDO drains daily  Follow up next week--call for an appointment

## 2018-03-16 NOTE — DISCHARGE NOTE ADULT - HOSPITAL COURSE
62F PMH melanoma s/p SLNB -> completion axillary dissection presented with Left arm lymphedema. 3/16 she underwent vascularized lymph node transfer from Right groin to Left axilla. The patient tolerated the procedure well without complication, was extubated in the OR, and transferred to the PACU in stable condition. She stayed overnight and was discharged home POD1 in stable condition with JPs.

## 2018-03-17 VITALS
TEMPERATURE: 98 F | OXYGEN SATURATION: 98 % | SYSTOLIC BLOOD PRESSURE: 130 MMHG | RESPIRATION RATE: 16 BRPM | HEART RATE: 78 BPM | DIASTOLIC BLOOD PRESSURE: 76 MMHG

## 2018-03-17 PROCEDURE — 38792 RA TRACER ID OF SENTINL NODE: CPT | Mod: RT

## 2018-03-17 PROCEDURE — 78195 LYMPH SYSTEM IMAGING: CPT

## 2018-03-17 PROCEDURE — 86901 BLOOD TYPING SEROLOGIC RH(D): CPT

## 2018-03-17 PROCEDURE — 86870 RBC ANTIBODY IDENTIFICATION: CPT

## 2018-03-17 PROCEDURE — 86850 RBC ANTIBODY SCREEN: CPT

## 2018-03-17 PROCEDURE — 86900 BLOOD TYPING SEROLOGIC ABO: CPT

## 2018-03-17 PROCEDURE — 38999 UNLISTD PX HEMIC/LYMPHTC SYS: CPT

## 2018-03-17 PROCEDURE — C1889: CPT

## 2018-03-17 PROCEDURE — A9541: CPT

## 2018-03-17 PROCEDURE — 14301 TIS TRNFR ANY 30.1-60 SQ CM: CPT

## 2018-03-17 PROCEDURE — 14302 TIS TRNFR ADDL 30 SQ CM: CPT | Mod: XU

## 2018-03-17 PROCEDURE — 86922 COMPATIBILITY TEST ANTIGLOB: CPT

## 2018-03-17 RX ADMIN — OXYCODONE HYDROCHLORIDE 10 MILLIGRAM(S): 5 TABLET ORAL at 00:30

## 2018-03-17 RX ADMIN — Medication 100 MILLIGRAM(S): at 06:38

## 2018-03-17 RX ADMIN — OXYCODONE HYDROCHLORIDE 5 MILLIGRAM(S): 5 TABLET ORAL at 03:10

## 2018-03-17 RX ADMIN — OXYCODONE HYDROCHLORIDE 5 MILLIGRAM(S): 5 TABLET ORAL at 04:10

## 2018-03-17 NOTE — PROGRESS NOTE ADULT - SUBJECTIVE AND OBJECTIVE BOX
Plastic Surgery Progress Note (pg [facility]: [id number], [facility]: [phone])    SUBJECTIVE:  Doing well. No overnight events. Pain well controlled, no PONV, ready for d/c.    OBJECTIVE:     ** VITAL SIGNS / I&O's **    Vital Signs Last 24 Hrs  T(C): 36.8 (17 Mar 2018 07:00), Max: 37.1 (16 Mar 2018 16:35)  T(F): 98.2 (17 Mar 2018 07:00), Max: 98.8 (16 Mar 2018 16:35)  HR: 76 (17 Mar 2018 07:00) (68 - 96)  BP: 136/75 (17 Mar 2018 07:00) (112/54 - 192/90)  BP(mean): 85 (17 Mar 2018 07:00) (77 - 90)  RR: 15 (17 Mar 2018 07:00) (15 - 18)  SpO2: 98% (17 Mar 2018 07:00) (95% - 100%)      16 Mar 2018 07:01  -  17 Mar 2018 07:00  --------------------------------------------------------  IN:    lactated ringers.: 450 mL    Oral Fluid: 660 mL  Total IN: 1110 mL    OUT:    Bulb: 35 mL    Bulb: 20 mL    Voided: 575 mL  Total OUT: 630 mL    Total NET: 480 mL          ** PHYSICAL EXAM **    -- CONSTITUTIONAL: AOx3. NAD.   -- CHEST: Axillary incision c/d/i; no collections, HERNANDO ss  -- EXTREMITIES: R groin incision c/d/i, no collections, HERNANDO ss      **MEDS**  acetaminophen   Tablet 650 milliGRAM(s) Oral every 6 hours PRN  acetaminophen   Tablet. 650 milliGRAM(s) Oral every 6 hours PRN  aspirin 325 milliGRAM(s) Oral daily  ceFAZolin   IVPB 1000 milliGRAM(s) IV Intermittent every 8 hours  diphenhydrAMINE   Injectable 25 milliGRAM(s) IV Push every 6 hours PRN  docusate sodium 100 milliGRAM(s) Oral three times a day  enoxaparin Injectable 40 milliGRAM(s) SubCutaneous every 24 hours  lactated ringers Bolus 1000 milliLiter(s) IV Bolus once  lactated ringers. 1000 milliLiter(s) IV Continuous <Continuous>  metoprolol    tartrate Injectable 5 milliGRAM(s) IV Push every 6 hours PRN  ondansetron Injectable 4 milliGRAM(s) IV Push every 8 hours PRN  oxyCODONE    IR 5 milliGRAM(s) Oral every 4 hours PRN  oxyCODONE    IR 10 milliGRAM(s) Oral every 4 hours PRN  senna 2 Tablet(s) Oral at bedtime

## 2018-03-17 NOTE — PROGRESS NOTE ADULT - ASSESSMENT
A/P: 62F s/p lymph node transfer from R groin to L axilla (POD1)  - Pain control  - OOB w assistance  - Reg diet  - Abx  - HERNANDO care  - d/c home once seen by Dr. Longo

## 2018-03-17 NOTE — ASU DISCHARGE PLAN (ADULT/PEDIATRIC). - MEDICATION SUMMARY - MEDICATIONS TO TAKE
I will START or STAY ON the medications listed below when I get home from the hospital:    aspirin 325 mg oral tablet  -- 1 tab(s) by mouth once a day  -- Indication: For Pain of left upper extremity    Percocet 5/325 oral tablet  -- 1 tab(s) by mouth every 6 hours, As Needed -for severe pain MDD:4 tabs   -- Caution federal law prohibits the transfer of this drug to any person other  than the person for whom it was prescribed.  May cause drowsiness.  Alcohol may intensify this effect.  Use care when operating dangerous machinery.  This prescription cannot be refilled.  This product contains acetaminophen.  Do not use  with any other product containing acetaminophen to prevent possible liver damage.  Using more of this medication than prescribed may cause serious breathing problems.    -- Indication: For Pain of left upper extremity    Keflex 500 mg oral capsule  -- 1 tab(s) by mouth every 6 hours  -- Indication: For Anti-infective

## 2018-03-17 NOTE — ASU DISCHARGE PLAN (ADULT/PEDIATRIC). - ITEMS TO FOLLOWUP WITH YOUR PHYSICIAN'S
Please follow up with Dr. Longo within x1 week after discharge from the hospital. You may call [phone] to schedule an appointment.

## 2018-03-17 NOTE — ASU DISCHARGE PLAN (ADULT/PEDIATRIC). - SPECIAL INSTRUCTIONS
Resume normal diet. Avoid straining, exercise, or heavy lifting.    Take medications as instructed by prescriptions.    You will be discharged with HERNANDO drains. You will need to empty them and record outputs accurately. This will be taught to you by the nursing staff. Please do not remove the HERNANDO drains. They will be removed in the office. Please bring to the office accurate records of output.     24 hrs after surgery, it's OK to shower/rinse with warm/soapy water, pat dry (NO scrubbing or rubbing).    You have a transparent/purple liquid dressing (called Dermabond) over your surgical incisions called. Do NOT remove the Dermabond. IN a few days, the Dermabond will fall off (or peel off) on its own.    Do not raise arms above head.    Follow-up with primary care doctor as well.     Call 911 and return to the ED for chest pain, shortness of breath, significant increase in pain, or significant change in color of surgical sites.

## 2018-03-22 DIAGNOSIS — I87.2 VENOUS INSUFFICIENCY (CHRONIC) (PERIPHERAL): ICD-10-CM

## 2018-03-22 DIAGNOSIS — M79.602 PAIN IN LEFT ARM: ICD-10-CM

## 2018-03-22 DIAGNOSIS — Z01.818 ENCOUNTER FOR OTHER PREPROCEDURAL EXAMINATION: ICD-10-CM

## 2018-07-12 ENCOUNTER — APPOINTMENT (OUTPATIENT)
Dept: SURGICAL ONCOLOGY | Facility: CLINIC | Age: 63
End: 2018-07-12
Payer: COMMERCIAL

## 2018-07-12 VITALS
HEIGHT: 70 IN | WEIGHT: 170 LBS | SYSTOLIC BLOOD PRESSURE: 186 MMHG | BODY MASS INDEX: 24.34 KG/M2 | DIASTOLIC BLOOD PRESSURE: 96 MMHG | RESPIRATION RATE: 15 BRPM | HEART RATE: 86 BPM

## 2018-07-12 PROCEDURE — 99213 OFFICE O/P EST LOW 20 MIN: CPT

## 2018-07-17 PROBLEM — I89.0 LYMPHEDEMA, NOT ELSEWHERE CLASSIFIED: Chronic | Status: ACTIVE | Noted: 2018-03-09

## 2018-07-17 PROBLEM — C43.9 MALIGNANT MELANOMA OF SKIN, UNSPECIFIED: Chronic | Status: ACTIVE | Noted: 2018-03-09

## 2018-07-25 ENCOUNTER — FORM ENCOUNTER (OUTPATIENT)
Age: 63
End: 2018-07-25

## 2018-07-26 ENCOUNTER — APPOINTMENT (OUTPATIENT)
Dept: MRI IMAGING | Facility: IMAGING CENTER | Age: 63
End: 2018-07-26
Payer: COMMERCIAL

## 2018-07-26 ENCOUNTER — OUTPATIENT (OUTPATIENT)
Dept: OUTPATIENT SERVICES | Facility: HOSPITAL | Age: 63
LOS: 1 days | End: 2018-07-26
Payer: COMMERCIAL

## 2018-07-26 DIAGNOSIS — Z98.890 OTHER SPECIFIED POSTPROCEDURAL STATES: Chronic | ICD-10-CM

## 2018-07-26 DIAGNOSIS — C43.59 MALIGNANT MELANOMA OF OTHER PART OF TRUNK: ICD-10-CM

## 2018-07-26 DIAGNOSIS — Z87.2 PERSONAL HISTORY OF DISEASES OF THE SKIN AND SUBCUTANEOUS TISSUE: Chronic | ICD-10-CM

## 2018-07-26 DIAGNOSIS — I89.9 NONINFECTIVE DISORDER OF LYMPHATIC VESSELS AND LYMPH NODES, UNSPECIFIED: Chronic | ICD-10-CM

## 2018-07-26 PROCEDURE — 70553 MRI BRAIN STEM W/O & W/DYE: CPT

## 2018-07-26 PROCEDURE — 82565 ASSAY OF CREATININE: CPT

## 2018-07-26 PROCEDURE — 70553 MRI BRAIN STEM W/O & W/DYE: CPT | Mod: 26

## 2018-07-26 PROCEDURE — A9585: CPT

## 2018-11-19 ENCOUNTER — FORM ENCOUNTER (OUTPATIENT)
Age: 63
End: 2018-11-19

## 2018-11-20 ENCOUNTER — APPOINTMENT (OUTPATIENT)
Dept: ULTRASOUND IMAGING | Facility: IMAGING CENTER | Age: 63
End: 2018-11-20
Payer: COMMERCIAL

## 2018-11-20 ENCOUNTER — APPOINTMENT (OUTPATIENT)
Dept: MAMMOGRAPHY | Facility: IMAGING CENTER | Age: 63
End: 2018-11-20
Payer: COMMERCIAL

## 2018-11-20 ENCOUNTER — OUTPATIENT (OUTPATIENT)
Dept: OUTPATIENT SERVICES | Facility: HOSPITAL | Age: 63
LOS: 1 days | End: 2018-11-20
Payer: COMMERCIAL

## 2018-11-20 DIAGNOSIS — Z98.890 OTHER SPECIFIED POSTPROCEDURAL STATES: Chronic | ICD-10-CM

## 2018-11-20 DIAGNOSIS — I89.9 NONINFECTIVE DISORDER OF LYMPHATIC VESSELS AND LYMPH NODES, UNSPECIFIED: Chronic | ICD-10-CM

## 2018-11-20 DIAGNOSIS — Z00.00 ENCOUNTER FOR GENERAL ADULT MEDICAL EXAMINATION WITHOUT ABNORMAL FINDINGS: ICD-10-CM

## 2018-11-20 DIAGNOSIS — Z87.2 PERSONAL HISTORY OF DISEASES OF THE SKIN AND SUBCUTANEOUS TISSUE: Chronic | ICD-10-CM

## 2018-11-20 PROCEDURE — G0279: CPT

## 2018-11-20 PROCEDURE — 77066 DX MAMMO INCL CAD BI: CPT | Mod: 26

## 2018-11-20 PROCEDURE — 77067 SCR MAMMO BI INCL CAD: CPT

## 2018-11-20 PROCEDURE — 77063 BREAST TOMOSYNTHESIS BI: CPT

## 2018-11-20 PROCEDURE — G0279: CPT | Mod: 26

## 2018-11-20 PROCEDURE — 77066 DX MAMMO INCL CAD BI: CPT

## 2018-11-20 PROCEDURE — 76641 ULTRASOUND BREAST COMPLETE: CPT | Mod: 26,50

## 2018-11-20 PROCEDURE — 76641 ULTRASOUND BREAST COMPLETE: CPT

## 2018-12-13 ENCOUNTER — APPOINTMENT (OUTPATIENT)
Dept: SURGICAL ONCOLOGY | Facility: CLINIC | Age: 63
End: 2018-12-13
Payer: COMMERCIAL

## 2018-12-13 VITALS
SYSTOLIC BLOOD PRESSURE: 208 MMHG | DIASTOLIC BLOOD PRESSURE: 104 MMHG | HEART RATE: 90 BPM | RESPIRATION RATE: 14 BRPM | BODY MASS INDEX: 24.34 KG/M2 | HEIGHT: 70 IN | WEIGHT: 170 LBS

## 2018-12-13 PROCEDURE — 99214 OFFICE O/P EST MOD 30 MIN: CPT

## 2019-01-21 ENCOUNTER — FORM ENCOUNTER (OUTPATIENT)
Age: 64
End: 2019-01-21

## 2019-01-22 ENCOUNTER — OUTPATIENT (OUTPATIENT)
Dept: OUTPATIENT SERVICES | Facility: HOSPITAL | Age: 64
LOS: 1 days | End: 2019-01-22
Payer: COMMERCIAL

## 2019-01-22 ENCOUNTER — APPOINTMENT (OUTPATIENT)
Dept: CT IMAGING | Facility: IMAGING CENTER | Age: 64
End: 2019-01-22
Payer: COMMERCIAL

## 2019-01-22 DIAGNOSIS — Z87.2 PERSONAL HISTORY OF DISEASES OF THE SKIN AND SUBCUTANEOUS TISSUE: Chronic | ICD-10-CM

## 2019-01-22 DIAGNOSIS — Z98.890 OTHER SPECIFIED POSTPROCEDURAL STATES: Chronic | ICD-10-CM

## 2019-01-22 DIAGNOSIS — I89.9 NONINFECTIVE DISORDER OF LYMPHATIC VESSELS AND LYMPH NODES, UNSPECIFIED: Chronic | ICD-10-CM

## 2019-01-22 DIAGNOSIS — C43.59 MALIGNANT MELANOMA OF OTHER PART OF TRUNK: ICD-10-CM

## 2019-01-22 PROCEDURE — 74177 CT ABD & PELVIS W/CONTRAST: CPT

## 2019-01-22 PROCEDURE — 71260 CT THORAX DX C+: CPT

## 2019-01-22 PROCEDURE — 82565 ASSAY OF CREATININE: CPT

## 2019-01-22 PROCEDURE — 71260 CT THORAX DX C+: CPT | Mod: 26

## 2019-01-22 PROCEDURE — 74177 CT ABD & PELVIS W/CONTRAST: CPT | Mod: 26

## 2019-02-12 ENCOUNTER — APPOINTMENT (OUTPATIENT)
Dept: PULMONOLOGY | Facility: CLINIC | Age: 64
End: 2019-02-12
Payer: COMMERCIAL

## 2019-02-12 VITALS
OXYGEN SATURATION: 98 % | DIASTOLIC BLOOD PRESSURE: 104 MMHG | BODY MASS INDEX: 24.34 KG/M2 | HEIGHT: 70 IN | TEMPERATURE: 97.7 F | WEIGHT: 170 LBS | SYSTOLIC BLOOD PRESSURE: 204 MMHG | RESPIRATION RATE: 15 BRPM | HEART RATE: 90 BPM

## 2019-02-12 DIAGNOSIS — J98.01 ACUTE BRONCHOSPASM: ICD-10-CM

## 2019-02-12 PROCEDURE — 99215 OFFICE O/P EST HI 40 MIN: CPT

## 2019-02-12 RX ORDER — NITROFURANTOIN (MONOHYDRATE/MACROCRYSTALS) 25; 75 MG/1; MG/1
100 CAPSULE ORAL
Qty: 14 | Refills: 0 | Status: DISCONTINUED | COMMUNITY
Start: 2018-03-06 | End: 2019-02-12

## 2019-02-12 RX ORDER — ETHAMBUTOL HYDROCHLORIDE 400 MG/1
400 TABLET ORAL DAILY
Qty: 90 | Refills: 5 | Status: DISCONTINUED | COMMUNITY
Start: 2017-09-14 | End: 2019-02-12

## 2019-02-12 RX ORDER — CEFADROXIL 500 MG/1
500 CAPSULE ORAL
Qty: 14 | Refills: 0 | Status: DISCONTINUED | COMMUNITY
Start: 2018-03-22 | End: 2019-02-12

## 2019-02-12 RX ORDER — CEPHALEXIN 500 MG/1
500 CAPSULE ORAL
Qty: 21 | Refills: 0 | Status: DISCONTINUED | COMMUNITY
Start: 2018-07-09 | End: 2019-02-12

## 2019-02-12 NOTE — REASON FOR VISIT
[Cough] : cough [Follow-Up] : a follow-up visit [Abnormal CT Scan] : abnormal CT Scan [FreeTextEntry2] : RED / hemoptysis [FreeTextEntry1] : RED hemoptysis

## 2019-02-12 NOTE — LETTER BODY
[Courtesy Letter] :      I had the pleasure of seeing your patient, [unfilled], in my office today.  [Today's Evaluation] : today's evaluation [FreeTextEntry1] : This letter  is regarding your patient  who  attended pulmonary out patient office today.  I have reviewed  patient's  past history, social history, family history and medication list. I also  reviewed nurse practitioners/ and fellows  notes and assessment and agree with it.  \par \par Krupa Paulino, attended pulmonary outpatient office  today .  She is a lady who has past history of melanoma of the chest which was diagnosed nearly one and half years ago and now recently had developed cough.  On a CAT scan, she was found to have abnormal infiltrate and she was referred here for further evaluation. 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 She followed up with Dr. Skyler Mendez was advised to  follow her clinically . Patient also  had stage III melanoma of the left anterior chest wall.S he underwent resection in June 2010.  Subsequently she had left axillary lymph node dissection in July 2010.  The patient denies any history of fever, chills, rigors, chest pain, or hemoptysis.  No history of any smoking, no history of any alcoholism, no history of any recent travels.  She is not on oxygen.  She is a nurse at [employer].  No history of any weigh loss or weight gain.  Medication wise, she is on Ativan, Marinol, and interferon therapy.  Her last therapy for melanoma was in July 2011. \par \par patient is stable from our point of view. Repeat a CAT scan in three months time. In the meantime she'll follow up with Dr. CHEW  for melanoma point of view. I did discuss with her that these changes in the lungs probably reflective of her RED infection and and also associated intrapulmonary lymph nodes. Plan was discussed with the patient in detail. She'll follow the above-mentioned recommendation return in three months time after CAT scan of the chest. Thanks for allowing  me to participate  in the care of this patient.  Patient at this time  will follow  the above mentioned recommendations and return back for follow up visit. If you have any questions  I can be reached  at [phone]  or  [phone].

## 2019-02-12 NOTE — DISCUSSION/SUMMARY
[FreeTextEntry1] : ------------ASSESSMENT AND PLAN:------------------ Pt has a previous history of malignant melanoma and RED on bronchoscopy. ---------continues to have small amts of HEMOPTYSIS ---LOIS AND  ON ly -----PET SCAN shows small RUL cavity-now fluid filled, but NO SIGNIFICANT bronchiectasis to explain the hemoptysis. \par \par 1) RED/RUL lesion: no change in CT 1/2019; yearly CT scan for melanoma/lesion per Dr. Nolasco, no need for sputum culture at this time\par \par 2) bronchospasm: likely related to anesthesia; will get PFTs now, send albuterol rx to pharmacy; advised her to use it before any operations to help mitigate post-op bronchospasm\par \par rtc 1 year\par \par    -----------Thanks for allowing  me to participate  in the care of this patient.  Patient at this time  will follow  the above mentioned recommendations and return back for follow up visit. If you have any questions  I can be reached  at [phone]  or  [phone].

## 2019-02-12 NOTE — HISTORY OF PRESENT ILLNESS
[Stable] : are stable [Difficulty Breathing During Exertion] : stable dyspnea on exertion [Cough] : stable coughing [2  -  Slight] : 2, slight [Date: ___] : was performed [unfilled] [Adherent] : the patient is adherent with ~his/her~ medication regimen [Oxygen] : the patient uses no supplemental oxygen [de-identified] : ocass wheeze\par  [de-identified] : shows similar changes in the upper lobe as compared to before. Please see the report attached. [FreeTextEntry1] : --------Krupa Paulino, attended pulmonary outpatient office  today .  \par \par She is a 56 year old woman with history of MELANOMA ------------------- stage III - s/p RESECTION in 6/2010 - left anterior chest and MYCOBACTERIUM AVIUM INTRACELLULARE.  She was initially referred here for abnormal infiltrates - bronched [2015]   which showed RED. She followed up with Dr. Skyler Mendez.\par \par Patient also  had stage III melanoma of the left anterior chest wall. She underwent resection in June 2010.  Subsequently she had left axillary lymph node dissection in July 2010. Previously she was on Ativan, Marinol, and interferon therapy.  Her last therapy for melanoma was in July 2011----She is has come back today for a follow up evaluation of recurrent cough and night sweats that started one month ago.  She denies any fever, sputum production, hemoptysis, chest pain or shortness of breath. Her CAT scan (1/15/2013) showd mucoid impacted nodules in right upper lobe. HerPET SCAN (7/1/2013) also showed tiny nodules in Rt upper lobe and no recurrent melanoma.  She follows Dr. Nolasco for markus's level 3 malignant melanoma.   \par \par No history of any smoking, no history of any alcoholism, no history of any recent travels.  no history of weight loss or weight gain.  She is not on oxygen.  She is a nurse at [employer].\par she had hemoptysis recently x couple days- right upper lobe 8mm nodular opacity.\par \par \par she DOES NOT wish to get treated for RED anymore given her s/e and intolerances from before. No fevers or weight loss. Unchanged SOB - ET of 1 flight of stairs.  \par \par CT chest 4/2017 1  cm  cavitary  lesion  in  the  posterior  segment  of  right  upper  lobe  with  few\par adjacent  nodules  shows  an  interval  slight  thickening  of  the  wall  of  the\par cavity  when  compared  to  previous  exam.----now has slight hemoptysis-----\par \par 2/12/2019: Patient here for f/u.  Had follow up CT in 1/2019 - slightly smaller RUL lesion, no longer cavitary appearing.  Pt also had multiple surgeries on left shoulder/arm for lymphedema (lymph node transfer surgeries) in the last year.  Each time after anesthesia, she had bronchospasm and chest tightness that required albuterol.  She would use it for up to 5 days post op.  No history of asthma as a child, non smoker, mom used to smoke at home,  used to smoke cigars in the house.  No seasonal allergies.  No sob/spnecer with activities otherwise.  Barely using albuterol at all - only really using it hero-operatively.  Of note, patient SBP >200 here, but she states it's always high at doctor's offices and it is 120s/70s at home.\par

## 2019-02-12 NOTE — PHYSICAL EXAM
[General Appearance - Well Developed] : well developed [Normal Appearance] : normal appearance [Well Groomed] : well groomed [General Appearance - Well Nourished] : well nourished [No Deformities] : no deformities [General Appearance - In No Acute Distress] : no acute distress [Normal Conjunctiva] : the conjunctiva exhibited no abnormalities [Eyelids - No Xanthelasma] : the eyelids demonstrated no xanthelasmas [Normal Oropharynx] : normal oropharynx [Neck Appearance] : the appearance of the neck was normal [Neck Cervical Mass (___cm)] : no neck mass was observed [Jugular Venous Distention Increased] : there was no jugular-venous distention [Thyroid Diffuse Enlargement] : the thyroid was not enlarged [Thyroid Nodule] : there were no palpable thyroid nodules [Heart Rate And Rhythm] : heart rate and rhythm were normal [Heart Sounds] : normal S1 and S2 [Murmurs] : no murmurs present [Respiration, Rhythm And Depth] : normal respiratory rhythm and effort [Exaggerated Use Of Accessory Muscles For Inspiration] : no accessory muscle use [Auscultation Breath Sounds / Voice Sounds] : lungs were clear to auscultation bilaterally [Abdomen Soft] : soft [Abdomen Tenderness] : non-tender [Abdomen Mass (___ Cm)] : no abdominal mass palpated [Abnormal Walk] : normal gait [Gait - Sufficient For Exercise Testing] : the gait was sufficient for exercise testing [Skin Color & Pigmentation] : normal skin color and pigmentation [No Venous Stasis] : no venous stasis [Skin Lesions] : no skin lesions [No Skin Ulcers] : no skin ulcer [No Xanthoma] : no  xanthoma was observed [Cranial Nerves] : cranial nerves 2-12 were intact [Normal Oral Mucosa] : normal oral mucosa [No Oral Pallor] : no oral pallor [No Oral Cyanosis] : no oral cyanosis [Nail Clubbing] : no clubbing of the fingernails [Cyanosis, Localized] : no localized cyanosis [Petechial Hemorrhages (___cm)] : no petechial hemorrhages [] : no ischemic changes [Oriented To Time, Place, And Person] : oriented to person, place, and time [Affect] : the affect was normal [Mood] : the mood was normal [No Anxiety] : not feeling anxious [FreeTextEntry2] : trace edema bilateral

## 2019-02-13 RX ORDER — ALBUTEROL SULFATE 90 UG/1
108 (90 BASE) INHALANT RESPIRATORY (INHALATION)
Qty: 1 | Refills: 5 | Status: DISCONTINUED | COMMUNITY
Start: 2019-02-12 | End: 2019-02-13

## 2019-03-01 ENCOUNTER — TRANSCRIPTION ENCOUNTER (OUTPATIENT)
Age: 64
End: 2019-03-01

## 2019-03-19 ENCOUNTER — APPOINTMENT (OUTPATIENT)
Dept: PULMONOLOGY | Facility: CLINIC | Age: 64
End: 2019-03-19
Payer: COMMERCIAL

## 2019-03-19 PROCEDURE — 94060 EVALUATION OF WHEEZING: CPT

## 2019-03-19 PROCEDURE — 94729 DIFFUSING CAPACITY: CPT

## 2019-03-19 PROCEDURE — 94726 PLETHYSMOGRAPHY LUNG VOLUMES: CPT

## 2019-03-28 ENCOUNTER — MEDICATION RENEWAL (OUTPATIENT)
Age: 64
End: 2019-03-28

## 2019-03-28 DIAGNOSIS — J44.9 CHRONIC OBSTRUCTIVE PULMONARY DISEASE, UNSPECIFIED: ICD-10-CM

## 2019-03-28 RX ORDER — BUDESONIDE AND FORMOTEROL FUMARATE DIHYDRATE 160; 4.5 UG/1; UG/1
160-4.5 AEROSOL RESPIRATORY (INHALATION) TWICE DAILY
Qty: 1 | Refills: 2 | Status: ACTIVE | COMMUNITY
Start: 2019-03-28 | End: 1900-01-01

## 2019-05-14 ENCOUNTER — APPOINTMENT (OUTPATIENT)
Dept: PULMONOLOGY | Facility: CLINIC | Age: 64
End: 2019-05-14
Payer: COMMERCIAL

## 2019-05-14 VITALS
RESPIRATION RATE: 16 BRPM | SYSTOLIC BLOOD PRESSURE: 180 MMHG | WEIGHT: 170 LBS | TEMPERATURE: 97.4 F | BODY MASS INDEX: 24.39 KG/M2 | DIASTOLIC BLOOD PRESSURE: 103 MMHG | HEART RATE: 80 BPM | OXYGEN SATURATION: 98 %

## 2019-05-14 DIAGNOSIS — R05 COUGH: ICD-10-CM

## 2019-05-14 DIAGNOSIS — J45.909 UNSPECIFIED ASTHMA, UNCOMPLICATED: ICD-10-CM

## 2019-05-14 PROCEDURE — 99215 OFFICE O/P EST HI 40 MIN: CPT

## 2019-05-14 RX ORDER — PREDNISONE 10 MG/1
10 TABLET ORAL DAILY
Qty: 60 | Refills: 2 | Status: ACTIVE | COMMUNITY
Start: 2019-05-14 | End: 1900-01-01

## 2019-05-14 RX ORDER — FLUTICASONE FUROATE, UMECLIDINIUM BROMIDE AND VILANTEROL TRIFENATATE 100; 62.5; 25 UG/1; UG/1; UG/1
100-62.5-25 POWDER RESPIRATORY (INHALATION) DAILY
Qty: 1 | Refills: 5 | Status: ACTIVE | COMMUNITY
Start: 2019-05-14 | End: 1900-01-01

## 2019-05-14 RX ORDER — AZITHROMYCIN 250 MG/1
250 TABLET, FILM COATED ORAL
Qty: 1 | Refills: 0 | Status: ACTIVE | COMMUNITY
Start: 2019-05-14 | End: 1900-01-01

## 2019-06-13 ENCOUNTER — APPOINTMENT (OUTPATIENT)
Dept: SURGICAL ONCOLOGY | Facility: CLINIC | Age: 64
End: 2019-06-13
Payer: COMMERCIAL

## 2019-06-13 VITALS
BODY MASS INDEX: 24.34 KG/M2 | SYSTOLIC BLOOD PRESSURE: 182 MMHG | OXYGEN SATURATION: 99 % | HEIGHT: 70 IN | HEART RATE: 97 BPM | WEIGHT: 170 LBS | DIASTOLIC BLOOD PRESSURE: 81 MMHG

## 2019-06-13 PROCEDURE — 99214 OFFICE O/P EST MOD 30 MIN: CPT

## 2019-06-13 NOTE — HISTORY OF PRESENT ILLNESS
[de-identified] : 64 year-old lady being seen for the followup of stage III melanoma of the LEFT CHEST.\par \par December 2018 she presented with a 48 hour of cellulitic changes in the left upper extremity.\par This was not preceded by any kind of injury.\par She has not had any constitutional signs or symptoms.\par Previous similar episodes were treated with Keflex, but now she is allergic.\par 24 hours prior she started doxycycline.\par \par On my examination she had erythema of the left upper extremity, without any associated warmth, crepitus, fluctuance, or break in the integrity of the skin.\par Also, no evidence of recurrent melanoma.\par \par She responded to oral antibiotics.\par \par She returns for scheduled oncologic followup today.\par \par No other complaints presently\par \par July 2018 visit she had reported some headaches and double vision.\par She had no other specific or constitutional neurologic signs or symptoms.\par August 2018 grade MRI performed at our location demonstrated no abnormalities or interval changes.\par \par June 2010 a 3.0 mm melanoma of the left chest  was resected with negative margins. \par Reconstruction was by Dr. Shaw Patterson\par A +SLNbx prompted a completion dissection which showed NO additional disease in 24 lymph nodes identified by the pathologist.\par She received adjuvant interferon under the supervision of Dr. Bry Elmore.\par \par She goes to the [facility] for treatment, currently with a gauntlet and glove, for her left upper extremity.\par She had lymph node transfer surgery, March 2018 by Dr. Longo - Unfortunately, no benefit.\par Consideration is being given to division of some of the latissimus dorsi muscle to allow for increased mobility of her upper extremity.\par \par +FH\par Her father had melanoma.\par She has tested negative on the Melaris assay.\par \par Her dermatologist is Dr. Mike WHITLEY,.\par Fall 2019 pending\par \par Her gynecologist is Dr. Lucia JACKSON\par She is overdue(Still), and aware.\par \par Her last eye examination was  December 2017\par \par Her internist is Dr. Frank D'Amico.\par No pacemaker or defibrillator.\par No anticoagulants.\par Current medications:;.-  ProAir inhaler, recently diagnosed with COPD felt to be due to secondary smoke from her mother.\par \par She is BRCA negative\par Her mother had postmenopausal ovarian cancer.\par 11-20-18.\par Bilateral mammograms and breast ultrasounds at 450 are normal, BI-RADS 2.\par Annual breast imaging will be repeated November 2019..- prescriptions verified\par \par She sees Dr. Karuna Grimaldo from pulmonary medicine for RED.\par \par Will check with Dr Albert about her next C-scope, She is aware that she is overdue.- still\par \par Had  left CW scar revision and fat-grafting by [facility]

## 2019-06-13 NOTE — REASON FOR VISIT
[Follow-Up Visit] : a follow-up visit for [Other: _____] : [unfilled] [FreeTextEntry2] : Stage III left chest melanoma

## 2019-06-13 NOTE — ASSESSMENT
[FreeTextEntry1] : January 2019 CT chest, abdomen, pelvis at 450 showed no evidence of recurrent disease.\par \par Imaging will be repeated in another year, January 2020, Prescriptions entered\par \par Presently, clinically, doing well.\par I've asked to see her in another year, sooner if needed

## 2019-06-13 NOTE — PHYSICAL EXAM
[Normal] : supple, no neck mass and thyroid not enlarged [Normal Neck Lymph Nodes] : normal neck lymph nodes  [Normal Supraclavicular Lymph Nodes] : normal supraclavicular lymph nodes [Normal Axillary Lymph Nodes] : normal axillary lymph nodes [Normal] : full range of motion and no deformities appreciated [de-identified] : below [de-identified] : Groins not examined

## 2019-07-09 ENCOUNTER — APPOINTMENT (OUTPATIENT)
Dept: PULMONOLOGY | Facility: CLINIC | Age: 64
End: 2019-07-09
Payer: COMMERCIAL

## 2019-07-09 VITALS
TEMPERATURE: 97.2 F | SYSTOLIC BLOOD PRESSURE: 199 MMHG | WEIGHT: 170 LBS | DIASTOLIC BLOOD PRESSURE: 111 MMHG | OXYGEN SATURATION: 98 % | HEART RATE: 91 BPM | BODY MASS INDEX: 24.39 KG/M2 | RESPIRATION RATE: 16 BRPM

## 2019-07-09 DIAGNOSIS — A31.0 PULMONARY MYCOBACTERIAL INFECTION: ICD-10-CM

## 2019-07-09 PROCEDURE — 99215 OFFICE O/P EST HI 40 MIN: CPT

## 2019-07-09 NOTE — DISCUSSION/SUMMARY
[FreeTextEntry1] : ------------ASSESSMENT AND PLAN:------------------ Pt has a previous history of malignant melanoma and RED on bronchoscopy. --OTHERWISE ASYMPTOMATIC FROPM PULM POINT OF VIEW--- \par \par 1) RED/RUL lesion: no change in CT 1/2019; yearly CT scan of chest\par 2) cough- start zpack- prednisone 20mg w/GI prophylaxis\par 3) CONTD  trelegy inhaler daily, proair for rescue\par 4)  F/U MONTHS weeks\par \par 5  CLEARED FOR SURGERY UNDER GA FROM PULM POINT OF VIEW---WILL NEED TO BE WATCHED FOR BRONCHOSPASM IN INTRA AND POST OP PERIOD=- \par \par    -----------Thanks for allowing  me to participate  in the care of this patient.  Patient at this time  will follow  the above mentioned recommendations and return back for follow up visit. If you have any questions  I can be reached  at [phone]  or  [phone].  \par \par Karuna Grimaldo MD, FCCP \par Director, Pulmonary Hypertension Program \par [facility] \par Division of Pulmonary, Critical Care and Sleep Medicine \par  Professor of Medicine \par [employer]\par

## 2019-07-09 NOTE — REVIEW OF SYSTEMS
[Hemoptysis] : hemoptysis [Cough] : cough [Dyspnea] : dyspnea [Difficulty Initiating Sleep] : difficulty falling asleep [Nausea] : nausea [Negative] : Endocrine [Palpitations] : no palpitations [Edema] : ~T edema was not present [FreeTextEntry6] : leg cramps\par  [FreeTextEntry7] : ativan prn\par

## 2019-07-09 NOTE — HISTORY OF PRESENT ILLNESS
[Stable] : are stable [Cough] : stable coughing [Difficulty Breathing During Exertion] : stable dyspnea on exertion [2  -  Slight] : 2, slight [Date: ___] : was performed [unfilled] [Adherent] : the patient is adherent with ~his/her~ medication regimen [de-identified] : ocass wheeze\par  [Oxygen] : the patient uses no supplemental oxygen [FreeTextEntry1] : --------Krupa Paulino, attended pulmonary outpatient office  today .  \par \par She is a 64 year old woman with history of MELANOMA ---- stage III - s/p RESECTION in 6/2010 - left anterior chest and MYCOBACTERIUM AVIUM INTRACELLULARE .  She was initially referred here for abnormal infiltrates - bronched [2015]   which showed RED. She followed up with Dr. Skyler Mendez.\par \par Patient also  had stage III melanoma of the left anterior chest wall. She underwent resection in June 2010.  Subsequently she had left axillary lymph node dissection in July 2010. Previously she was on Ativan, Marinol, and interferon therapy.  Her last therapy for melanoma was in July 2011----She is has come back today for a follow up evaluation of recurrent cough and night sweats that started one month ago.  She denies any fever, sputum production, hemoptysis, chest pain or shortness of breath. Her CAT scan (1/15/2013) showd mucoid impacted nodules in right upper lobe. HerPET SCAN (7/1/2013) also showed tiny nodules in Rt upper lobe and no recurrent melanoma.  She follows Dr. Nolasco for markus's level 3 malignant melanoma.   \par \par No history of any smoking, no history of any alcoholism, no history of any recent travels.  no history of weight loss or weight gain.  She is not on oxygen.  She is a nurse at [employer].\par she had hemoptysis recently x couple days- right upper lobe 8mm nodular opacity.\par \par \par she DOES NOT wish to get treated for RED anymore given her s/e and intolerances from before. No fevers or weight loss. Unchanged SOB - ET of 1 flight of stairs.  \par \par CT chest 4/2017 1  cm  cavitary  lesion  in  the  posterior  segment  of  right  upper  lobe  with  few\par adjacent  nodules  shows  an  interval  slight  thickening  of  the  wall  of  the\par cavity  when  compared  to  previous  exam.----now has slight hemoptysis-----\par \par  CT in 1/2019 - slightly smaller RUL lesion, no longer cavitary appearing. \par  Pt also had multiple surgeries on left shoulder/arm for lymphedema (lymph node transfer surgeries) in the last year.  Each time after anesthesia, she had bronchospasm and chest tightness that required albuterol.  She would use it for up to 5 days post op.  No history of asthma as a child, non smoker, mom used to smoke at home,  used to smoke cigars in the house.  No seasonal allergies.  No sob/spencer with activities otherwise.  \par \par \par may 2019 here for worsening cough- started symbicort - using w/proair but minimal relief. She is afebrile, mild SPENCER with stair climbing\par JULY 2109 --ON PROAIR AND TRELEGY INHALERS , FEELS BETTER, AWAITING SHOULDER SURGERY - [de-identified] : shows similar changes in the upper lobe as compared to before. Please see the report attached.

## 2019-07-09 NOTE — DISCUSSION/SUMMARY
[FreeTextEntry1] : ------------ASSESSMENT AND PLAN:------------------ Pt has a previous history of malignant melanoma ------s/p RESECTION in 6/2010 from - left anterior chest --------and RED on bronchoscopy. --OTHERWISE ASYMPTOMATIC FROM PULM POINT OF VIEW--- \par \par 1) RED/RUL lesion: no change in CT 1/2019; yearly CT scan of chest -pt not on nay treatment for the red at present- \par 2) cough- better now \par 3) CONTD  trelegy inhaler daily, proair for rescue\par 4)  F/U   4 to 6 MONTHS weeks\par \par 5  CLEARED FOR SURGERY UNDER GA FROM PULM POINT OF VIEW---WILL NEED TO BE WATCHED FOR BRONCHOSPASM IN INTRA AND POST OP PERIOD=- \par \par    -----------Thanks for allowing  me to participate  in the care of this patient.  Patient at this time  will follow  the above mentioned recommendations and return back for follow up visit. If you have any questions  I can be reached  at [phone]  or  [phone].  \par \par Karuna Grimaldo MD, FCCP \par Director, Pulmonary Hypertension Program \par [facility] \par Division of Pulmonary, Critical Care and Sleep Medicine \par  Professor of Medicine \par [employer]\par

## 2019-07-09 NOTE — REVIEW OF SYSTEMS
[Hemoptysis] : hemoptysis [Dyspnea] : dyspnea [Cough] : cough [Nausea] : nausea [Difficulty Initiating Sleep] : difficulty falling asleep [Negative] : Endocrine [Edema] : ~T edema was not present [FreeTextEntry7] : ativan prn\par  [Palpitations] : no palpitations [FreeTextEntry6] : leg cramps\par

## 2019-07-09 NOTE — PHYSICAL EXAM
[Normal Appearance] : normal appearance [Well Groomed] : well groomed [General Appearance - Well Developed] : well developed [No Deformities] : no deformities [General Appearance - In No Acute Distress] : no acute distress [General Appearance - Well Nourished] : well nourished [Normal Conjunctiva] : the conjunctiva exhibited no abnormalities [Eyelids - No Xanthelasma] : the eyelids demonstrated no xanthelasmas [Neck Appearance] : the appearance of the neck was normal [Normal Oropharynx] : normal oropharynx [Jugular Venous Distention Increased] : there was no jugular-venous distention [Neck Cervical Mass (___cm)] : no neck mass was observed [Thyroid Diffuse Enlargement] : the thyroid was not enlarged [Thyroid Nodule] : there were no palpable thyroid nodules [Heart Rate And Rhythm] : heart rate and rhythm were normal [Heart Sounds] : normal S1 and S2 [Murmurs] : no murmurs present [Respiration, Rhythm And Depth] : normal respiratory rhythm and effort [Auscultation Breath Sounds / Voice Sounds] : lungs were clear to auscultation bilaterally [Exaggerated Use Of Accessory Muscles For Inspiration] : no accessory muscle use [Abdomen Soft] : soft [Tenderness: ____] : tenderness [unfilled] [Abdomen Mass (___ Cm)] : no abdominal mass palpated [Abdomen Tenderness] : non-tender [Gait - Sufficient For Exercise Testing] : the gait was sufficient for exercise testing [Abnormal Walk] : normal gait [Skin Color & Pigmentation] : normal skin color and pigmentation [No Venous Stasis] : no venous stasis [Skin Lesions] : no skin lesions [No Skin Ulcers] : no skin ulcer [Cranial Nerves] : cranial nerves 2-12 were intact [No Xanthoma] : no  xanthoma was observed [Normal Oral Mucosa] : normal oral mucosa [No Oral Cyanosis] : no oral cyanosis [No Oral Pallor] : no oral pallor [Nail Clubbing] : no clubbing of the fingernails [Cyanosis, Localized] : no localized cyanosis [] : no ischemic changes [Petechial Hemorrhages (___cm)] : no petechial hemorrhages [Oriented To Time, Place, And Person] : oriented to person, place, and time [Mood] : the mood was normal [Affect] : the affect was normal [No Anxiety] : not feeling anxious [FreeTextEntry2] : trace edema bilateral

## 2019-07-09 NOTE — REASON FOR VISIT
[Cough] : cough [Follow-Up] : a follow-up visit [Abnormal CT Scan] : abnormal CT Scan [FreeTextEntry2] : RED / hemoptysis [FreeTextEntry1] : RED

## 2019-07-09 NOTE — REASON FOR VISIT
[Cough] : cough [Follow-Up] : a follow-up visit [Abnormal CT Scan] : abnormal CT Scan [FreeTextEntry2] : RED / hemoptysis [FreeTextEntry1] : RED h-ABNORMAL CT CHEST

## 2019-07-09 NOTE — PHYSICAL EXAM
[General Appearance - Well Developed] : well developed [Normal Appearance] : normal appearance [Well Groomed] : well groomed [General Appearance - Well Nourished] : well nourished [Normal Conjunctiva] : the conjunctiva exhibited no abnormalities [General Appearance - In No Acute Distress] : no acute distress [No Deformities] : no deformities [Normal Oropharynx] : normal oropharynx [Eyelids - No Xanthelasma] : the eyelids demonstrated no xanthelasmas [Neck Appearance] : the appearance of the neck was normal [Jugular Venous Distention Increased] : there was no jugular-venous distention [Neck Cervical Mass (___cm)] : no neck mass was observed [Thyroid Diffuse Enlargement] : the thyroid was not enlarged [Heart Rate And Rhythm] : heart rate and rhythm were normal [Thyroid Nodule] : there were no palpable thyroid nodules [Murmurs] : no murmurs present [Heart Sounds] : normal S1 and S2 [Auscultation Breath Sounds / Voice Sounds] : lungs were clear to auscultation bilaterally [Exaggerated Use Of Accessory Muscles For Inspiration] : no accessory muscle use [Abdomen Soft] : soft [Respiration, Rhythm And Depth] : normal respiratory rhythm and effort [Abdomen Tenderness] : non-tender [Gait - Sufficient For Exercise Testing] : the gait was sufficient for exercise testing [Abnormal Walk] : normal gait [Abdomen Mass (___ Cm)] : no abdominal mass palpated [Skin Color & Pigmentation] : normal skin color and pigmentation [No Venous Stasis] : no venous stasis [Skin Lesions] : no skin lesions [No Skin Ulcers] : no skin ulcer [No Xanthoma] : no  xanthoma was observed [Cranial Nerves] : cranial nerves 2-12 were intact [Normal Oral Mucosa] : normal oral mucosa [No Oral Pallor] : no oral pallor [No Oral Cyanosis] : no oral cyanosis [Nail Clubbing] : no clubbing of the fingernails [Cyanosis, Localized] : no localized cyanosis [Petechial Hemorrhages (___cm)] : no petechial hemorrhages [] : no ischemic changes [Oriented To Time, Place, And Person] : oriented to person, place, and time [Affect] : the affect was normal [Mood] : the mood was normal [No Anxiety] : not feeling anxious [Tenderness: ____] : tenderness [unfilled] [FreeTextEntry2] : trace edema bilateral

## 2019-07-09 NOTE — HISTORY OF PRESENT ILLNESS
[Stable] : are stable [Difficulty Breathing During Exertion] : stable dyspnea on exertion [Cough] : stable coughing [2  -  Slight] : 2, slight [Adherent] : the patient is adherent with ~his/her~ medication regimen [Date: ___] : was performed [unfilled] [Oxygen] : the patient uses no supplemental oxygen [de-identified] : ocass wheeze\par  [de-identified] : shows similar changes in the upper lobe as compared to before. Please see the report attached. [FreeTextEntry1] : --------Krupa Paulino, attended pulmonary outpatient office  today .  \par \par She is a 64 year old woman with history of MELANOMA ---- stage III - s/p RESECTION in 6/2010 - left anterior chest and MYCOBACTERIUM AVIUM INTRACELLULARE .  She was initially referred here for abnormal infiltrates - bronched [2015]   which showed RED. She followed up with Dr. Skyler Mendez.\par \par Patient also  had stage III melanoma of the left anterior chest wall. She underwent resection in June 2010.  Subsequently she had left axillary lymph node dissection in July 2010. Previously she was on Ativan, Marinol, and interferon therapy.  Her last therapy for melanoma was in July 2011----She is has come back today for a follow up evaluation of recurrent cough and night sweats that started one month ago.  She denies any fever, sputum production, hemoptysis, chest pain or shortness of breath. Her CAT scan (1/15/2013) showd mucoid impacted nodules in right upper lobe. HerPET SCAN (7/1/2013) also showed tiny nodules in Rt upper lobe and no recurrent melanoma.  She follows Dr. Nolasco for markus's level 3 malignant melanoma.   \par \par No history of any smoking, no history of any alcoholism, no history of any recent travels.  no history of weight loss or weight gain.  She is not on oxygen.  She is a nurse at [employer].\par she had hemoptysis recently x couple days- right upper lobe 8mm nodular opacity.\par \par \par she DOES NOT wish to get treated for RED anymore given her s/e and intolerances from before. No fevers or weight loss. Unchanged SOB - ET of 1 flight of stairs.  \par \par CT chest 4/2017 1  cm  cavitary  lesion  in  the  posterior  segment  of  right  upper  lobe  with  few\par adjacent  nodules  shows  an  interval  slight  thickening  of  the  wall  of  the\par cavity  when  compared  to  previous  exam.----now has slight hemoptysis-----\par \par  CT in 1/2019 - slightly smaller RUL lesion, no longer cavitary appearing. \par  Pt also had multiple surgeries on left shoulder/arm for lymphedema (lymph node transfer surgeries) in the last year.  Each time after anesthesia, she had bronchospasm and chest tightness that required albuterol.  She would use it for up to 5 days post op.  No history of asthma as a child, non smoker, mom used to smoke at home,  used to smoke cigars in the house.  No seasonal allergies.  No sob/spencer with activities otherwise.  \par \par \par may 2019 here for worsening cough- started symbicort - using w/proair but minimal relief. She is afebrile, mild SPENCER with stair climbing\par JULY 2109 --ON PROAIR AND TRELEGY INHALERS , FEELS BETTER, AWAITING SHOULDER SURGERY -

## 2019-08-27 ENCOUNTER — APPOINTMENT (OUTPATIENT)
Dept: PULMONOLOGY | Facility: CLINIC | Age: 64
End: 2019-08-27

## 2019-09-26 ENCOUNTER — APPOINTMENT (OUTPATIENT)
Dept: SURGICAL ONCOLOGY | Facility: CLINIC | Age: 64
End: 2019-09-26
Payer: COMMERCIAL

## 2019-09-26 VITALS
RESPIRATION RATE: 15 BRPM | WEIGHT: 170 LBS | SYSTOLIC BLOOD PRESSURE: 207 MMHG | BODY MASS INDEX: 24.34 KG/M2 | DIASTOLIC BLOOD PRESSURE: 100 MMHG | OXYGEN SATURATION: 99 % | HEART RATE: 114 BPM | HEIGHT: 70 IN

## 2019-09-26 DIAGNOSIS — N64.59 OTHER SIGNS AND SYMPTOMS IN BREAST: ICD-10-CM

## 2019-09-26 PROCEDURE — 99215 OFFICE O/P EST HI 40 MIN: CPT

## 2019-10-02 ENCOUNTER — FORM ENCOUNTER (OUTPATIENT)
Age: 64
End: 2019-10-02

## 2019-10-03 ENCOUNTER — APPOINTMENT (OUTPATIENT)
Dept: ULTRASOUND IMAGING | Facility: IMAGING CENTER | Age: 64
End: 2019-10-03
Payer: COMMERCIAL

## 2019-10-03 ENCOUNTER — APPOINTMENT (OUTPATIENT)
Dept: MAMMOGRAPHY | Facility: IMAGING CENTER | Age: 64
End: 2019-10-03
Payer: COMMERCIAL

## 2019-10-03 ENCOUNTER — OUTPATIENT (OUTPATIENT)
Dept: OUTPATIENT SERVICES | Facility: HOSPITAL | Age: 64
LOS: 1 days | End: 2019-10-03
Payer: COMMERCIAL

## 2019-10-03 DIAGNOSIS — Z87.2 PERSONAL HISTORY OF DISEASES OF THE SKIN AND SUBCUTANEOUS TISSUE: Chronic | ICD-10-CM

## 2019-10-03 DIAGNOSIS — N64.59 OTHER SIGNS AND SYMPTOMS IN BREAST: ICD-10-CM

## 2019-10-03 DIAGNOSIS — Z98.890 OTHER SPECIFIED POSTPROCEDURAL STATES: Chronic | ICD-10-CM

## 2019-10-03 DIAGNOSIS — I89.9 NONINFECTIVE DISORDER OF LYMPHATIC VESSELS AND LYMPH NODES, UNSPECIFIED: Chronic | ICD-10-CM

## 2019-10-03 PROCEDURE — G0279: CPT | Mod: 26

## 2019-10-03 PROCEDURE — 76641 ULTRASOUND BREAST COMPLETE: CPT | Mod: 26,50

## 2019-10-03 PROCEDURE — 77066 DX MAMMO INCL CAD BI: CPT

## 2019-10-03 PROCEDURE — 76641 ULTRASOUND BREAST COMPLETE: CPT

## 2019-10-03 PROCEDURE — 77066 DX MAMMO INCL CAD BI: CPT | Mod: 26

## 2019-10-03 PROCEDURE — G0279: CPT

## 2019-10-10 ENCOUNTER — FORM ENCOUNTER (OUTPATIENT)
Age: 64
End: 2019-10-10

## 2019-10-11 ENCOUNTER — APPOINTMENT (OUTPATIENT)
Dept: ULTRASOUND IMAGING | Facility: IMAGING CENTER | Age: 64
End: 2019-10-11
Payer: COMMERCIAL

## 2019-10-11 ENCOUNTER — OUTPATIENT (OUTPATIENT)
Dept: OUTPATIENT SERVICES | Facility: HOSPITAL | Age: 64
LOS: 1 days | End: 2019-10-11
Payer: COMMERCIAL

## 2019-10-11 ENCOUNTER — RESULT REVIEW (OUTPATIENT)
Age: 64
End: 2019-10-11

## 2019-10-11 DIAGNOSIS — Z98.890 OTHER SPECIFIED POSTPROCEDURAL STATES: Chronic | ICD-10-CM

## 2019-10-11 DIAGNOSIS — R92.8 OTHER ABNORMAL AND INCONCLUSIVE FINDINGS ON DIAGNOSTIC IMAGING OF BREAST: ICD-10-CM

## 2019-10-11 DIAGNOSIS — Z87.2 PERSONAL HISTORY OF DISEASES OF THE SKIN AND SUBCUTANEOUS TISSUE: Chronic | ICD-10-CM

## 2019-10-11 DIAGNOSIS — I89.9 NONINFECTIVE DISORDER OF LYMPHATIC VESSELS AND LYMPH NODES, UNSPECIFIED: Chronic | ICD-10-CM

## 2019-10-11 PROCEDURE — 19083 BX BREAST 1ST LESION US IMAG: CPT

## 2019-10-11 PROCEDURE — 77065 DX MAMMO INCL CAD UNI: CPT

## 2019-10-11 PROCEDURE — A4648: CPT

## 2019-10-11 PROCEDURE — 77065 DX MAMMO INCL CAD UNI: CPT | Mod: 26,LT

## 2019-10-11 PROCEDURE — 88305 TISSUE EXAM BY PATHOLOGIST: CPT

## 2019-10-11 PROCEDURE — 19083 BX BREAST 1ST LESION US IMAG: CPT | Mod: LT

## 2019-10-11 PROCEDURE — 88305 TISSUE EXAM BY PATHOLOGIST: CPT | Mod: 26

## 2019-10-13 ENCOUNTER — FORM ENCOUNTER (OUTPATIENT)
Age: 64
End: 2019-10-13

## 2019-10-14 ENCOUNTER — OUTPATIENT (OUTPATIENT)
Dept: OUTPATIENT SERVICES | Facility: HOSPITAL | Age: 64
LOS: 1 days | End: 2019-10-14
Payer: COMMERCIAL

## 2019-10-14 ENCOUNTER — APPOINTMENT (OUTPATIENT)
Dept: MRI IMAGING | Facility: IMAGING CENTER | Age: 64
End: 2019-10-14
Payer: COMMERCIAL

## 2019-10-14 DIAGNOSIS — Z98.890 OTHER SPECIFIED POSTPROCEDURAL STATES: Chronic | ICD-10-CM

## 2019-10-14 DIAGNOSIS — I89.9 NONINFECTIVE DISORDER OF LYMPHATIC VESSELS AND LYMPH NODES, UNSPECIFIED: Chronic | ICD-10-CM

## 2019-10-14 DIAGNOSIS — Z87.2 PERSONAL HISTORY OF DISEASES OF THE SKIN AND SUBCUTANEOUS TISSUE: Chronic | ICD-10-CM

## 2019-10-14 DIAGNOSIS — N64.59 OTHER SIGNS AND SYMPTOMS IN BREAST: ICD-10-CM

## 2019-10-14 PROCEDURE — C8937: CPT

## 2019-10-14 PROCEDURE — A9585: CPT

## 2019-10-14 PROCEDURE — C8908: CPT

## 2019-10-14 PROCEDURE — 77049 MRI BREAST C-+ W/CAD BI: CPT | Mod: 26

## 2019-10-17 ENCOUNTER — FORM ENCOUNTER (OUTPATIENT)
Age: 64
End: 2019-10-17

## 2019-10-18 ENCOUNTER — APPOINTMENT (OUTPATIENT)
Dept: ULTRASOUND IMAGING | Facility: IMAGING CENTER | Age: 64
End: 2019-10-18
Payer: COMMERCIAL

## 2019-10-18 ENCOUNTER — OUTPATIENT (OUTPATIENT)
Dept: OUTPATIENT SERVICES | Facility: HOSPITAL | Age: 64
LOS: 1 days | End: 2019-10-18
Payer: COMMERCIAL

## 2019-10-18 ENCOUNTER — RESULT REVIEW (OUTPATIENT)
Age: 64
End: 2019-10-18

## 2019-10-18 DIAGNOSIS — Z98.890 OTHER SPECIFIED POSTPROCEDURAL STATES: Chronic | ICD-10-CM

## 2019-10-18 DIAGNOSIS — Z87.2 PERSONAL HISTORY OF DISEASES OF THE SKIN AND SUBCUTANEOUS TISSUE: Chronic | ICD-10-CM

## 2019-10-18 DIAGNOSIS — R92.8 OTHER ABNORMAL AND INCONCLUSIVE FINDINGS ON DIAGNOSTIC IMAGING OF BREAST: ICD-10-CM

## 2019-10-18 DIAGNOSIS — I89.9 NONINFECTIVE DISORDER OF LYMPHATIC VESSELS AND LYMPH NODES, UNSPECIFIED: Chronic | ICD-10-CM

## 2019-10-18 PROCEDURE — 88341 IMHCHEM/IMCYTCHM EA ADD ANTB: CPT

## 2019-10-18 PROCEDURE — 77065 DX MAMMO INCL CAD UNI: CPT

## 2019-10-18 PROCEDURE — 19083 BX BREAST 1ST LESION US IMAG: CPT

## 2019-10-18 PROCEDURE — 88360 TUMOR IMMUNOHISTOCHEM/MANUAL: CPT

## 2019-10-18 PROCEDURE — 19083 BX BREAST 1ST LESION US IMAG: CPT | Mod: LT

## 2019-10-18 PROCEDURE — 77065 DX MAMMO INCL CAD UNI: CPT | Mod: 26,LT

## 2019-10-18 PROCEDURE — 88305 TISSUE EXAM BY PATHOLOGIST: CPT | Mod: 26

## 2019-10-18 PROCEDURE — 88342 IMHCHEM/IMCYTCHM 1ST ANTB: CPT

## 2019-10-18 PROCEDURE — A4648: CPT

## 2019-10-18 PROCEDURE — 88341 IMHCHEM/IMCYTCHM EA ADD ANTB: CPT | Mod: 26,59

## 2019-10-18 PROCEDURE — 88342 IMHCHEM/IMCYTCHM 1ST ANTB: CPT | Mod: 26,59

## 2019-10-18 PROCEDURE — 88305 TISSUE EXAM BY PATHOLOGIST: CPT

## 2019-10-18 PROCEDURE — 88360 TUMOR IMMUNOHISTOCHEM/MANUAL: CPT | Mod: 26

## 2019-10-24 ENCOUNTER — TRANSCRIPTION ENCOUNTER (OUTPATIENT)
Age: 64
End: 2019-10-24

## 2019-11-04 ENCOUNTER — TRANSCRIPTION ENCOUNTER (OUTPATIENT)
Age: 64
End: 2019-11-04

## 2019-11-21 ENCOUNTER — APPOINTMENT (OUTPATIENT)
Dept: MAMMOGRAPHY | Facility: IMAGING CENTER | Age: 64
End: 2019-11-21

## 2019-11-21 ENCOUNTER — MEDICATION RENEWAL (OUTPATIENT)
Age: 64
End: 2019-11-21

## 2019-11-21 ENCOUNTER — APPOINTMENT (OUTPATIENT)
Dept: ULTRASOUND IMAGING | Facility: IMAGING CENTER | Age: 64
End: 2019-11-21

## 2019-11-21 ENCOUNTER — TRANSCRIPTION ENCOUNTER (OUTPATIENT)
Age: 64
End: 2019-11-21

## 2019-11-21 RX ORDER — ALBUTEROL SULFATE 90 UG/1
108 (90 BASE) AEROSOL, METERED RESPIRATORY (INHALATION)
Qty: 1 | Refills: 5 | Status: ACTIVE | COMMUNITY
Start: 2019-02-13 | End: 1900-01-01

## 2019-12-03 ENCOUNTER — OUTPATIENT (OUTPATIENT)
Dept: OUTPATIENT SERVICES | Facility: HOSPITAL | Age: 64
LOS: 1 days | End: 2019-12-03
Payer: COMMERCIAL

## 2019-12-03 VITALS
OXYGEN SATURATION: 100 % | SYSTOLIC BLOOD PRESSURE: 152 MMHG | HEIGHT: 68.5 IN | WEIGHT: 187.83 LBS | RESPIRATION RATE: 18 BRPM | TEMPERATURE: 98 F | DIASTOLIC BLOOD PRESSURE: 80 MMHG | HEART RATE: 76 BPM

## 2019-12-03 DIAGNOSIS — R92.8 OTHER ABNORMAL AND INCONCLUSIVE FINDINGS ON DIAGNOSTIC IMAGING OF BREAST: ICD-10-CM

## 2019-12-03 DIAGNOSIS — Z87.09 PERSONAL HISTORY OF OTHER DISEASES OF THE RESPIRATORY SYSTEM: ICD-10-CM

## 2019-12-03 DIAGNOSIS — N63.0 UNSPECIFIED LUMP IN UNSPECIFIED BREAST: ICD-10-CM

## 2019-12-03 DIAGNOSIS — Z98.890 OTHER SPECIFIED POSTPROCEDURAL STATES: Chronic | ICD-10-CM

## 2019-12-03 DIAGNOSIS — I89.9 NONINFECTIVE DISORDER OF LYMPHATIC VESSELS AND LYMPH NODES, UNSPECIFIED: Chronic | ICD-10-CM

## 2019-12-03 DIAGNOSIS — Z01.818 ENCOUNTER FOR OTHER PREPROCEDURAL EXAMINATION: ICD-10-CM

## 2019-12-03 DIAGNOSIS — Z87.2 PERSONAL HISTORY OF DISEASES OF THE SKIN AND SUBCUTANEOUS TISSUE: Chronic | ICD-10-CM

## 2019-12-03 LAB
ANION GAP SERPL CALC-SCNC: 9 MMOL/L — SIGNIFICANT CHANGE UP (ref 5–17)
BUN SERPL-MCNC: 14 MG/DL — SIGNIFICANT CHANGE UP (ref 7–23)
CALCIUM SERPL-MCNC: 9.5 MG/DL — SIGNIFICANT CHANGE UP (ref 8.4–10.5)
CHLORIDE SERPL-SCNC: 103 MMOL/L — SIGNIFICANT CHANGE UP (ref 96–108)
CO2 SERPL-SCNC: 27 MMOL/L — SIGNIFICANT CHANGE UP (ref 22–31)
CREAT SERPL-MCNC: 0.72 MG/DL — SIGNIFICANT CHANGE UP (ref 0.5–1.3)
GLUCOSE SERPL-MCNC: 116 MG/DL — HIGH (ref 70–99)
HCT VFR BLD CALC: 41.9 % — SIGNIFICANT CHANGE UP (ref 34.5–45)
HGB BLD-MCNC: 13.8 G/DL — SIGNIFICANT CHANGE UP (ref 11.5–15.5)
MCHC RBC-ENTMCNC: 32.9 GM/DL — SIGNIFICANT CHANGE UP (ref 32–36)
MCHC RBC-ENTMCNC: 32.9 PG — SIGNIFICANT CHANGE UP (ref 27–34)
MCV RBC AUTO: 100 FL — SIGNIFICANT CHANGE UP (ref 80–100)
NRBC # BLD: 0 /100 WBCS — SIGNIFICANT CHANGE UP (ref 0–0)
PLATELET # BLD AUTO: 260 K/UL — SIGNIFICANT CHANGE UP (ref 150–400)
POTASSIUM SERPL-MCNC: 3.9 MMOL/L — SIGNIFICANT CHANGE UP (ref 3.5–5.3)
POTASSIUM SERPL-SCNC: 3.9 MMOL/L — SIGNIFICANT CHANGE UP (ref 3.5–5.3)
RBC # BLD: 4.19 M/UL — SIGNIFICANT CHANGE UP (ref 3.8–5.2)
RBC # FLD: 12.1 % — SIGNIFICANT CHANGE UP (ref 10.3–14.5)
SODIUM SERPL-SCNC: 139 MMOL/L — SIGNIFICANT CHANGE UP (ref 135–145)
WBC # BLD: 7.28 K/UL — SIGNIFICANT CHANGE UP (ref 3.8–10.5)
WBC # FLD AUTO: 7.28 K/UL — SIGNIFICANT CHANGE UP (ref 3.8–10.5)

## 2019-12-03 PROCEDURE — G0463: CPT

## 2019-12-03 PROCEDURE — 36415 COLL VENOUS BLD VENIPUNCTURE: CPT

## 2019-12-03 PROCEDURE — 80048 BASIC METABOLIC PNL TOTAL CA: CPT

## 2019-12-03 PROCEDURE — 93010 ELECTROCARDIOGRAM REPORT: CPT | Mod: NC

## 2019-12-03 PROCEDURE — 93005 ELECTROCARDIOGRAM TRACING: CPT

## 2019-12-03 PROCEDURE — 85027 COMPLETE CBC AUTOMATED: CPT

## 2019-12-03 RX ORDER — CEPHALEXIN 500 MG
1 CAPSULE ORAL
Qty: 0 | Refills: 0 | DISCHARGE

## 2019-12-03 NOTE — H&P PST ADULT - HISTORY OF PRESENT ILLNESS
65yo female with medical h/o COPD and PMHX Left chest wall melanoma removed in 2010 with lymph excision on left arm. Pt reports abnormal findings to left breast and presents today for PST for Excision Left Axilla w?wire Localization, Skin Biopsy Left Breast, Left Breast Oncoplastic Reconstruction scheduled for 12/13/2019 65yo female with medical h/o COPD and PMHX Left chest wall melanoma removed in 2010 with lymph excision on left arm. Pt reports abnormal findings to left breast and presents today for PST for Excision Left Axilla w/wire Localization, Skin Biopsy Left Breast, Left Breast Oncoplastic Reconstruction scheduled for 12/13/2019

## 2019-12-03 NOTE — H&P PST ADULT - NSICDXPASTMEDICALHX_GEN_ALL_CORE_FT
PAST MEDICAL HISTORY:  History of COPD     Lymphedema of arm left 2010 post lymph node excision left axilla    Melanoma left side of chest 2010 /excision of lesion and lymph node excision with immunotherapy  2010

## 2019-12-03 NOTE — H&P PST ADULT - NSANTHOSAYNRD_GEN_A_CORE
neck 14.5inches/No. AMERICO screening performed.  STOP BANG Legend: 0-2 = LOW Risk; 3-4 = INTERMEDIATE Risk; 5-8 = HIGH Risk

## 2019-12-03 NOTE — H&P PST ADULT - NSICDXPROBLEM_GEN_ALL_CORE_FT
PROBLEM DIAGNOSES  Problem: Other abnormal and inconclusive findings on diagnostic imaging of breast  Assessment and Plan:     Problem: History of COPD  Assessment and Plan: PROBLEM DIAGNOSES  Problem: Other abnormal and inconclusive findings on diagnostic imaging of breast  Assessment and Plan: Pre-op instructions given. Pt verbalized understanding  Pepcid & Chlorhexidine wash instructions given  Pending: Medical clearance - elevated BP with no dx of HTN    Problem: History of COPD  Assessment and Plan: Pt instructed to take meds as prescribed

## 2019-12-03 NOTE — H&P PST ADULT - NEGATIVE BREAST SYMPTOMS
no breast lump L/no breast lump R/no nipple discharge L/no nipple discharge R/no breast tenderness R

## 2019-12-03 NOTE — H&P PST ADULT - ATTENDING COMMENTS
64-year-old lady with a history of stage III melanoma of the left chest, one recent breast imaging had an abnormal-appearing ipsilateral lymph node, needle biopsy which was benign but discordant.    She is scheduled for excision of the axillary abnormality, with plastics closure, Dr. Vines.    Also, she has had some equivocal physical findings and imaging associated with the central ipsilateral breast, for which a skin biopsy will be performed.    This was reviewed with her prior to operation, and again on the morning of operation.    All questions answered, consent on chart

## 2019-12-03 NOTE — H&P PST ADULT - NSICDXPASTSURGICALHX_GEN_ALL_CORE_FT
PAST SURGICAL HISTORY:  Disorder of lymphatics lymph node dissection left axilla 2010    H/O knee surgery 20 years ago    H/O shoulder surgery left shoulder muscle release    H/O skin graft fat graft  chest area left 2x   2015 and 2016  for pain control as per patient    History of excision of lesion left side of chest  melanoma 2010

## 2019-12-03 NOTE — H&P PST ADULT - MUSCULOSKELETAL
negative detailed exam no joint swelling/no joint warmth/no calf tenderness/normal strength/ROM intact/no joint erythema

## 2019-12-12 ENCOUNTER — TRANSCRIPTION ENCOUNTER (OUTPATIENT)
Age: 64
End: 2019-12-12

## 2019-12-12 ENCOUNTER — FORM ENCOUNTER (OUTPATIENT)
Age: 64
End: 2019-12-12

## 2019-12-13 ENCOUNTER — APPOINTMENT (OUTPATIENT)
Dept: SURGICAL ONCOLOGY | Facility: HOSPITAL | Age: 64
End: 2019-12-13

## 2019-12-13 ENCOUNTER — RESULT REVIEW (OUTPATIENT)
Age: 64
End: 2019-12-13

## 2019-12-13 ENCOUNTER — OUTPATIENT (OUTPATIENT)
Dept: OUTPATIENT SERVICES | Facility: HOSPITAL | Age: 64
LOS: 1 days | End: 2019-12-13
Payer: COMMERCIAL

## 2019-12-13 VITALS
HEART RATE: 76 BPM | HEIGHT: 68.5 IN | OXYGEN SATURATION: 100 % | SYSTOLIC BLOOD PRESSURE: 165 MMHG | WEIGHT: 187.83 LBS | DIASTOLIC BLOOD PRESSURE: 80 MMHG | RESPIRATION RATE: 16 BRPM | TEMPERATURE: 98 F

## 2019-12-13 VITALS
SYSTOLIC BLOOD PRESSURE: 134 MMHG | TEMPERATURE: 98 F | RESPIRATION RATE: 16 BRPM | HEART RATE: 66 BPM | DIASTOLIC BLOOD PRESSURE: 76 MMHG | OXYGEN SATURATION: 97 %

## 2019-12-13 DIAGNOSIS — Z98.890 OTHER SPECIFIED POSTPROCEDURAL STATES: Chronic | ICD-10-CM

## 2019-12-13 DIAGNOSIS — R92.8 OTHER ABNORMAL AND INCONCLUSIVE FINDINGS ON DIAGNOSTIC IMAGING OF BREAST: ICD-10-CM

## 2019-12-13 DIAGNOSIS — Z87.2 PERSONAL HISTORY OF DISEASES OF THE SKIN AND SUBCUTANEOUS TISSUE: Chronic | ICD-10-CM

## 2019-12-13 DIAGNOSIS — N63.0 UNSPECIFIED LUMP IN UNSPECIFIED BREAST: ICD-10-CM

## 2019-12-13 DIAGNOSIS — I89.9 NONINFECTIVE DISORDER OF LYMPHATIC VESSELS AND LYMPH NODES, UNSPECIFIED: Chronic | ICD-10-CM

## 2019-12-13 PROCEDURE — 19125 EXCISION BREAST LESION: CPT

## 2019-12-13 PROCEDURE — 19083 BX BREAST 1ST LESION US IMAG: CPT

## 2019-12-13 PROCEDURE — 88304 TISSUE EXAM BY PATHOLOGIST: CPT

## 2019-12-13 PROCEDURE — ZZZZZ: CPT

## 2019-12-13 PROCEDURE — 19083 BX BREAST 1ST LESION US IMAG: CPT | Mod: LT

## 2019-12-13 PROCEDURE — 76098 X-RAY EXAM SURGICAL SPECIMEN: CPT

## 2019-12-13 PROCEDURE — 88307 TISSUE EXAM BY PATHOLOGIST: CPT | Mod: 26

## 2019-12-13 PROCEDURE — 88307 TISSUE EXAM BY PATHOLOGIST: CPT

## 2019-12-13 PROCEDURE — 88304 TISSUE EXAM BY PATHOLOGIST: CPT | Mod: 26

## 2019-12-13 PROCEDURE — 88305 TISSUE EXAM BY PATHOLOGIST: CPT | Mod: 26

## 2019-12-13 PROCEDURE — 19301 PARTIAL MASTECTOMY: CPT | Mod: LT

## 2019-12-13 PROCEDURE — 88305 TISSUE EXAM BY PATHOLOGIST: CPT

## 2019-12-13 RX ORDER — SODIUM CHLORIDE 9 MG/ML
1000 INJECTION, SOLUTION INTRAVENOUS
Refills: 0 | Status: DISCONTINUED | OUTPATIENT
Start: 2019-12-13 | End: 2019-12-13

## 2019-12-13 RX ORDER — HEPARIN SODIUM 5000 [USP'U]/ML
5000 INJECTION INTRAVENOUS; SUBCUTANEOUS ONCE
Refills: 0 | Status: COMPLETED | OUTPATIENT
Start: 2019-12-13 | End: 2019-12-13

## 2019-12-13 RX ORDER — ACETAMINOPHEN 500 MG
0 TABLET ORAL
Qty: 0 | Refills: 0 | DISCHARGE

## 2019-12-13 RX ORDER — OXYCODONE HYDROCHLORIDE 5 MG/1
5 TABLET ORAL ONCE
Refills: 0 | Status: DISCONTINUED | OUTPATIENT
Start: 2019-12-13 | End: 2019-12-13

## 2019-12-13 RX ORDER — ONDANSETRON 8 MG/1
4 TABLET, FILM COATED ORAL ONCE
Refills: 0 | Status: DISCONTINUED | OUTPATIENT
Start: 2019-12-13 | End: 2019-12-13

## 2019-12-13 RX ORDER — ONDANSETRON 8 MG/1
4 TABLET, FILM COATED ORAL ONCE
Refills: 0 | Status: DISCONTINUED | OUTPATIENT
Start: 2019-12-13 | End: 2019-12-28

## 2019-12-13 RX ORDER — HYDROMORPHONE HYDROCHLORIDE 2 MG/ML
0.5 INJECTION INTRAMUSCULAR; INTRAVENOUS; SUBCUTANEOUS
Refills: 0 | Status: DISCONTINUED | OUTPATIENT
Start: 2019-12-13 | End: 2019-12-13

## 2019-12-13 RX ADMIN — HYDROMORPHONE HYDROCHLORIDE 0.5 MILLIGRAM(S): 2 INJECTION INTRAMUSCULAR; INTRAVENOUS; SUBCUTANEOUS at 14:05

## 2019-12-13 RX ADMIN — HEPARIN SODIUM 5000 UNIT(S): 5000 INJECTION INTRAVENOUS; SUBCUTANEOUS at 11:22

## 2019-12-13 RX ADMIN — OXYCODONE HYDROCHLORIDE 5 MILLIGRAM(S): 5 TABLET ORAL at 16:34

## 2019-12-13 RX ADMIN — HYDROMORPHONE HYDROCHLORIDE 0.5 MILLIGRAM(S): 2 INJECTION INTRAMUSCULAR; INTRAVENOUS; SUBCUTANEOUS at 14:36

## 2019-12-13 RX ADMIN — OXYCODONE HYDROCHLORIDE 5 MILLIGRAM(S): 5 TABLET ORAL at 16:04

## 2019-12-13 RX ADMIN — HYDROMORPHONE HYDROCHLORIDE 0.5 MILLIGRAM(S): 2 INJECTION INTRAMUSCULAR; INTRAVENOUS; SUBCUTANEOUS at 13:50

## 2019-12-13 RX ADMIN — SODIUM CHLORIDE 50 MILLILITER(S): 9 INJECTION, SOLUTION INTRAVENOUS at 08:50

## 2019-12-13 RX ADMIN — HYDROMORPHONE HYDROCHLORIDE 0.5 MILLIGRAM(S): 2 INJECTION INTRAMUSCULAR; INTRAVENOUS; SUBCUTANEOUS at 14:15

## 2019-12-13 RX ADMIN — HYDROMORPHONE HYDROCHLORIDE 0.5 MILLIGRAM(S): 2 INJECTION INTRAMUSCULAR; INTRAVENOUS; SUBCUTANEOUS at 14:00

## 2019-12-13 NOTE — BRIEF OPERATIVE NOTE - OPERATION/FINDINGS
Asymptomatic, nonpalpable, left axillary adenopathy which on image guided biopsy is benign but discordant.  Excised the preoperative localization.    Also, equivocal skin changes centrally in the left breast, corresponding with nonspecific thickening on imaging, for which a skin biopsy was performed.

## 2019-12-13 NOTE — ASU DISCHARGE PLAN (ADULT/PEDIATRIC) - CALL YOUR DOCTOR IF YOU HAVE ANY OF THE FOLLOWING:
Pain not relieved by Medications/Numbness, tingling, color or temperature change to extremity/Increased irritability or sluggishness/Swelling that gets worse/Nausea and vomiting that does not stop/Excessive diarrhea/Bleeding that does not stop/Unable to urinate/Wound/Surgical Site with redness, or foul smelling discharge or pus/Inability to tolerate liquids or foods

## 2019-12-13 NOTE — PRE-ANESTHESIA EVALUATION ADULT - NSANTHOSAYNRD_GEN_A_CORE
No. AMERICO screening performed.  STOP BANG Legend: 0-2 = LOW Risk; 3-4 = INTERMEDIATE Risk; 5-8 = HIGH Risk/neck 14.5inches

## 2019-12-13 NOTE — ASU PATIENT PROFILE, ADULT - PSH
Disorder of lymphatics  lymph node dissection left axilla 2010  H/O knee surgery  20 years ago  H/O shoulder surgery  left shoulder muscle release  H/O skin graft  fat graft  chest area left 2x   2015 and 2016  for pain control as per patient  History of excision of lesion  left side of chest  melanoma 2010

## 2019-12-13 NOTE — ASU PATIENT PROFILE, ADULT - PMH
History of COPD    Lymphedema of arm  left 2010 post lymph node excision left axilla  Melanoma  left side of chest 2010 /excision of lesion and lymph node excision with immunotherapy  2010

## 2019-12-13 NOTE — ASU DISCHARGE PLAN (ADULT/PEDIATRIC) - ASU DC SPECIAL INSTRUCTIONSFT
Left axillary adenopathy excised with preoperative localization    Representative central breast excised for biopsy and histologic analysis. Left axillary adenopathy excised with preoperative localization    Representative central breast excised for biopsy and histologic analysis.    wear bra at all times except to shower    in the shower face the water with your back     please follow up with Dr. Champion in 1 week    take the medication prescribed to you by the office     Dr. Nolasco will call you in 10 days with the pathology results, this will determine future medical management

## 2019-12-19 LAB — SURGICAL PATHOLOGY STUDY: SIGNIFICANT CHANGE UP

## 2019-12-30 NOTE — REVIEW OF SYSTEMS
[Negative] : Neurological [FreeTextEntry5] : Lymphedema [FreeTextEntry6] : COPD [de-identified] : Melanoma [FreeTextEntry1] : increased risk of breast cancer

## 2019-12-30 NOTE — REASON FOR VISIT
[Other: _____] : [unfilled] [FreeTextEntry2] : Evaluation of a possible abnormality on breast self-examination

## 2019-12-30 NOTE — ASSESSMENT
[FreeTextEntry1] : The cause for the discoloration of the skin of the left breast is not apparent based on today's history and physical examination.\par \par Annual breast imaging would typically be November 2019, given above findings, will move the studies up.\par \par Also, due to increased breast cancer risk score (20.7), consider baseline breast MRI....(Below)\par \par January 2019 CT chest, abdomen, pelvis at 450 showed no evidence of recurrent disease.\par Imaging will be repeated in another year, January 2020, Prescriptions entered\par \par Further management based on above.\par \par Reviewed in detail, all questions answered\par \par \par 10-15-19.\par We spoke\par October 11, 2019, she had a left axillary sonogram guided core needle biopsy of the left axilla.\par Pathology demonstrates fibrofatty tissue and skeletal muscle.\par His pathology is considered DISCORDANT and radiologist recommending excision with preoperative localization.\par The patient understands and agrees, the procedure will be scheduled after additional issues have been addressed (below).\par On her breast MRI, a previously biopsied left breast nodule appears larger. The recommendation is for a repeat sonogram guided core needle biopsy of the left breast to evaluate the histology.\par She understands and agrees, and a prescription was entered today.\par In addition, she still has the skin discoloration and thickening of the left chest, so when she does have the single, or two site excisional biopsies from the left, we will also do a SKIN BX......(Below)\par \par \par 10-24-19:\par I returned her call\par Still awaiting results from Oct 18, 2019, core bx are still pending\par \par 10-30-19.\par We spoke.\par Pathology from October 18, 2019, left breast core biopsy (2:00) is benign.\par Awaiting addendum from radiology regarding concordance and followup recommendations.\par Independent of that, I entered the paperwork for excisional biopsy of her benign but discordant biopsy from October 11, at [facility], with Dr. Phillips from plastics.\par Procedure will include a representative skin biopsy to evaluate the skin thickening identified on recent imaging.\par \par \par 12-30-19.\par We spoke.\par December 13, 2019, she had wire localized excision of an asymptomatic nonpalpable abnormality in the tail of the left breast which on preoperative needle biopsy had benign but discordant pathology results.\par She had oncoplastic reconstruction by Dr. Eugenia Champion.\par Final pathology identified a reactive intramammary lymph node with changes related to the prior biopsy.\par The surrounding breast tissue had no worrisome findings.\par No further intervention is presently warranted.\par My office will call to schedule a postoperative visit.\par Note dictated

## 2019-12-30 NOTE — HISTORY OF PRESENT ILLNESS
[de-identified] : Presents with a 4 week history of intermittent reddish to purple discoloration of the central left breast.\par No associated symptoms.\par \par No preceding injury.\par \par No other signs or symptoms related to either breast.\par \par No provocative a palliative factors.\par \par \par +FH:\par Her mother had Ovarian cancer.\par A sister had breast cancer in her 50s\par \par Her own genetic testing demonstrates NO deleterious mutation.\par \par No previous breast biopsies.\par \par 2018 bilateral mammogram and sonogram at 450: BI-RADS 2\par (10-07-19:We spoke regarding results of Bilateral mammo/sono on Oct 03, 2019, @450: BI-RADS 4.\par #1. 5 mm nodule in the left axilla for which needle biopsy was recommended, and scheduled on , prescription entered.\par #2. Thickening of the skin of the left breast which MRI is recommended, prescription entered today.\par #3. Enlarging ( 0.5 x 0.4 x 0.4 cm => 0.6 x 0.7 x 0.5 cm) left breast nodule (2:00) management of which will be determined based on above results.....................)\par \par \par Menarche at 14\par  2, para 2, first at 30.\par Natural menopause at 52.\par No exogenous hormones\par \par Her breast cancer risk score (calculated at 19): 20.7\par \par \par \par History of stage III melanoma of the LEFT CHEST.\par \par 2018: cellulitic changes in the left upper extremity.\par Not preceded by any kind of injury.\par No constitutional signs or symptoms.\par Previous similar episodes were treated with Keflex, but now she is allergic.\par Treated with doxycycline.\par \par No other complaints presently\par \par 2018 visit she had reported some headaches and double vision.\par She had no other specific or constitutional neurologic signs or symptoms.\par 2018 grade MRI performed at our location demonstrated no abnormalities or interval changes.\par \par 2010 a 3.0 mm melanoma of the left chest  was resected with negative margins. \par Reconstruction was by Dr. Shaw Patterson\par A +SLNbx prompted a completion dissection which showed NO additional disease in 24 lymph nodes identified by the pathologist.\par She received adjuvant interferon under the supervision of Dr. Bry Elmore.\par \par She goes to the [facility] lymp[facility] for treatment, currently with a gauntlet and glove, for her left upper extremity.\par She had lymph node transfer surgery, 2018 by Dr. Longo - Unfortunately, no benefit.\par Consideration is being given to division of some of the latissimus dorsi muscle to allow for increased mobility of her upper extremity.\par \par +FH\par Her father had melanoma.\par She has tested negative on the Melaris assay.\par \par Her dermatologist is Dr. Mike WHITLEY,.\par 2019 pending\par \par Her gynecologist is Dr. Lucia JACKSON\par She is overdue(Still), and aware.\par \par Her last eye examination was  2017\par \par Her internist is Dr. Frank D'AMICO.\par \par No pacemaker or defibrillator.\par No anticoagulants.\par \par Current medications:;.-  ProAir inhaler, recently diagnosed with COPD felt to be due to secondary smoke from her mother.\par \par She sees Dr. Karuna Grimaldo from pulmonary medicine for RED.\par \par Will check with Dr Albert about her next C-scope, She is aware that she is overdue.- still\par \par Had  left CW scar revision and fat-grafting by [facility]

## 2019-12-30 NOTE — PHYSICAL EXAM
[Normal] : supple, no neck mass and thyroid not enlarged [Normal Neck Lymph Nodes] : normal neck lymph nodes  [Normal Supraclavicular Lymph Nodes] : normal supraclavicular lymph nodes [Normal] : full range of motion and no deformities appreciated [Normal Axillary Lymph Nodes] : normal axillary lymph nodes [de-identified] : below [de-identified] : Groins not examined

## 2020-01-06 NOTE — REVIEW OF SYSTEMS
Continues with copious oral secretions; pink frothy. Frequent oral suctioning provided. [Cough] : cough [Hemoptysis] : hemoptysis [Dyspnea] : dyspnea [Nausea] : nausea [Difficulty Initiating Sleep] : difficulty falling asleep [Negative] : Endocrine [Palpitations] : no palpitations [Edema] : ~T edema was not present [FreeTextEntry7] : ativan prn\par  [FreeTextEntry6] : leg cramps\par

## 2020-01-09 ENCOUNTER — APPOINTMENT (OUTPATIENT)
Dept: SURGICAL ONCOLOGY | Facility: CLINIC | Age: 65
End: 2020-01-09
Payer: COMMERCIAL

## 2020-01-09 VITALS
SYSTOLIC BLOOD PRESSURE: 187 MMHG | HEART RATE: 71 BPM | BODY MASS INDEX: 24.34 KG/M2 | WEIGHT: 170 LBS | HEIGHT: 70 IN | OXYGEN SATURATION: 99 % | DIASTOLIC BLOOD PRESSURE: 109 MMHG

## 2020-01-09 PROCEDURE — 99024 POSTOP FOLLOW-UP VISIT: CPT

## 2020-01-17 ENCOUNTER — APPOINTMENT (OUTPATIENT)
Dept: ULTRASOUND IMAGING | Facility: CLINIC | Age: 65
End: 2020-01-17
Payer: COMMERCIAL

## 2020-01-17 ENCOUNTER — OUTPATIENT (OUTPATIENT)
Dept: OUTPATIENT SERVICES | Facility: HOSPITAL | Age: 65
LOS: 1 days | End: 2020-01-17
Payer: COMMERCIAL

## 2020-01-17 DIAGNOSIS — Z98.890 OTHER SPECIFIED POSTPROCEDURAL STATES: Chronic | ICD-10-CM

## 2020-01-17 DIAGNOSIS — I89.9 NONINFECTIVE DISORDER OF LYMPHATIC VESSELS AND LYMPH NODES, UNSPECIFIED: Chronic | ICD-10-CM

## 2020-01-17 DIAGNOSIS — N64.89 OTHER SPECIFIED DISORDERS OF BREAST: ICD-10-CM

## 2020-01-17 DIAGNOSIS — Z87.2 PERSONAL HISTORY OF DISEASES OF THE SKIN AND SUBCUTANEOUS TISSUE: Chronic | ICD-10-CM

## 2020-01-17 PROCEDURE — 76641 ULTRASOUND BREAST COMPLETE: CPT | Mod: 26,50

## 2020-01-17 PROCEDURE — 76641 ULTRASOUND BREAST COMPLETE: CPT

## 2020-01-22 ENCOUNTER — FORM ENCOUNTER (OUTPATIENT)
Age: 65
End: 2020-01-22

## 2020-01-23 ENCOUNTER — APPOINTMENT (OUTPATIENT)
Dept: CT IMAGING | Facility: IMAGING CENTER | Age: 65
End: 2020-01-23
Payer: COMMERCIAL

## 2020-01-23 ENCOUNTER — OUTPATIENT (OUTPATIENT)
Dept: OUTPATIENT SERVICES | Facility: HOSPITAL | Age: 65
LOS: 1 days | End: 2020-01-23
Payer: COMMERCIAL

## 2020-01-23 DIAGNOSIS — N64.89 OTHER SPECIFIED DISORDERS OF BREAST: ICD-10-CM

## 2020-01-23 DIAGNOSIS — I89.9 NONINFECTIVE DISORDER OF LYMPHATIC VESSELS AND LYMPH NODES, UNSPECIFIED: Chronic | ICD-10-CM

## 2020-01-23 DIAGNOSIS — C43.59 MALIGNANT MELANOMA OF OTHER PART OF TRUNK: ICD-10-CM

## 2020-01-23 DIAGNOSIS — Z98.890 OTHER SPECIFIED POSTPROCEDURAL STATES: Chronic | ICD-10-CM

## 2020-01-23 DIAGNOSIS — Z87.2 PERSONAL HISTORY OF DISEASES OF THE SKIN AND SUBCUTANEOUS TISSUE: Chronic | ICD-10-CM

## 2020-01-23 LAB
GRAM STN FLD: SIGNIFICANT CHANGE UP
SPECIMEN SOURCE: SIGNIFICANT CHANGE UP

## 2020-01-23 PROCEDURE — 71260 CT THORAX DX C+: CPT

## 2020-01-23 PROCEDURE — C1729: CPT

## 2020-01-23 PROCEDURE — 74177 CT ABD & PELVIS W/CONTRAST: CPT

## 2020-01-23 PROCEDURE — 87205 SMEAR GRAM STAIN: CPT

## 2020-01-23 PROCEDURE — 10030 IMG GID FLU COLL DRG SFT TIS: CPT

## 2020-01-23 PROCEDURE — 82565 ASSAY OF CREATININE: CPT

## 2020-01-23 PROCEDURE — 71260 CT THORAX DX C+: CPT | Mod: 26

## 2020-01-23 PROCEDURE — 74177 CT ABD & PELVIS W/CONTRAST: CPT | Mod: 26

## 2020-01-23 PROCEDURE — 87070 CULTURE OTHR SPECIMN AEROBIC: CPT

## 2020-01-23 PROCEDURE — 87075 CULTR BACTERIA EXCEPT BLOOD: CPT

## 2020-01-23 RX ORDER — FENTANYL CITRATE 50 UG/ML
50 INJECTION INTRAVENOUS ONCE
Refills: 0 | Status: DISCONTINUED | OUTPATIENT
Start: 2020-01-23 | End: 2020-01-23

## 2020-01-23 NOTE — PHYSICAL EXAM
[Normal] : supple, no neck mass and thyroid not enlarged [Normal Neck Lymph Nodes] : normal neck lymph nodes  [Normal Supraclavicular Lymph Nodes] : normal supraclavicular lymph nodes [Normal Axillary Lymph Nodes] : normal axillary lymph nodes [Normal] : full range of motion and no deformities appreciated [de-identified] : Groins not examined [de-identified] : below

## 2020-01-23 NOTE — HISTORY OF PRESENT ILLNESS
[de-identified] : First postoperative visit for this 64-year-old lady who 2019 underwent a wire localization of a left axillary imaging abnormality, which on preoperative needle biopsy was benign but discordant.\par \par Final pathology from the recent operation:\par Tissue, tail of left breast.\par Breast and fibroadipose tissue with prior procedures A. changes, fibrocystic change, usual ductal hyperplasia, sclerosing adenosis, apocrine metaplasia, PASH, and benign epithelial calcifications.\par Intramammary reactive lymph node with prior procedure site changes including cystic degeneration. Accompanying skin was normal.\par \par She had a plastics closure by Dr. Eugenia DISLA\par \par There was an ipsilateral, enlarging left breast nodule, which was benign and concordant; one-year followup left breast ultrasound recommended.\par \par There was also unexplained skin thickening which prompted the skin biopsy which accompanied the excisional biopsy described above - benign.\par \par There were no additional suspicious findings on her preoperative, 2019, breast MRI.\par \par \par +FH:\par Her mother had Ovarian cancer.\par A sister had breast cancer in her 50s\par \par Her own genetic testing demonstrates NO deleterious mutation.\par \par No previous breast biopsies.\par \par \par Menarche at 14\par  2, para 2, first at 30.\par Natural menopause at 52.\par No exogenous hormones\par \par Her breast cancer risk score (calculated at 19): 20.7\par \par \par \par History of stage III melanoma of the LEFT CHEST.\par \par 2018: cellulitic changes in the left upper extremity.\par Not preceded by any kind of injury.\par No constitutional signs or symptoms.\par Previous similar episodes were treated with Keflex, but now she is allergic.\par Treated with doxycycline.\par \par \par 2018 visit she had reported some headaches and double vision.\par She had no other specific or constitutional neurologic signs or symptoms.\par 2018 brain MRI performed at our location demonstrated no abnormalities or interval changes.\par \par 2010 a 3.0 mm melanoma of the left chest  was resected with negative margins. \par Reconstruction was by Dr. Shaw Patterson\par Single +SLNbx prompted a completion dissection which showed NO additional disease in 24 lymph nodes identified by the pathologist.\par \par +Adjuvant interferon under the supervision of Dr. Bry Elmore.\par \par She goes to the [facility] lymphedema [facility] for treatment, currently with a gauntlet and glove, for her left upper extremity.\par She had lymph node transfer surgery, 2018 by Dr. Longo - Unfortunately, no benefit.\par Consideration is being given to division of some of the latissimus dorsi muscle to allow for increased mobility of her upper extremity.\par \par +FH\par Her father had melanoma.\par She has tested negative on the Melaris assay.\par \par Her dermatologist is Dr. Mike WHITLEY,.\par Reminded\par \par Her gynecologist is Dr. Lucia JACKSON\par She is overdue(Still), and aware.- Reminded\par \par Her last eye examination was  2017\par \par Her internist is Dr. Frank D'AMICO.\par \par Pulmonary: Dr. Karuna KOENIG\par \par No pacemaker or defibrillator.\par No anticoagulants.\par \par Current medications:;.-  ProAir inhaler, recently diagnosed with COPD felt to be due to secondary smoke from her mother.\par \par \par Will check with Dr Albert about her next C-scope, She is aware that she is overdue.- still - Reminded\par \par Had  left CW scar revision and fat-grafting by [facility]

## 2020-01-23 NOTE — ASSESSMENT
[FreeTextEntry1] : Clinically doing well.\par \par Annual mammogram and breast ultrasound will be October 2020, prescriptions entered.\par \par Consider a followup MRI, at an appropriate interval (breast cancer risk score 20.7)............................\par \par January 2019 CT chest, abdomen, and pelvis, no evidence of recurrent disease.\par \par Verify prescriptions for January 2020.\par \par Clinically doing well.\par If no problems we will see her in another year, sooner if needed\par \par \par 01-23-20.\par CT chest/abdomen/pelvis at 450.\par Stable, small, pulmonary nodules, largest 8 mm.\par No evidence of metastatic disease in the abdomen or pelvis

## 2020-01-23 NOTE — REVIEW OF SYSTEMS
[Negative] : Endocrine [FreeTextEntry5] : chronic pulmonary disease [de-identified] : Melanoma [FreeTextEntry1] : Benign axillary adenopathy

## 2020-01-23 NOTE — PROGRESS NOTE ADULT - SUBJECTIVE AND OBJECTIVE BOX
Interventional Radiology Brief Post-Procedure Note    Procedure: Left breast fluid collection drain placement    Operators: Deni Blankenship MD    Anesthesia (type): Local lidocaine    Contrast: None.    EBL: Minimal    Findings/Follow up Plan of Care: Successful 8 [place]ian drain placement into the left breast fluid collection with loculations. 20 mL of serous fluid was aspirated.    Specimens Removed: 20 mL serous fluid.     Implants: 8 [place]ian drain into left breast    Complications: No immediate complications.     Condition/Disposition: Discharge home. To follow up with plastic surgeon.     Please call Interventional Radiology x [phone] with any questions, concerns, or issues.

## 2020-01-28 LAB
CULTURE RESULTS: SIGNIFICANT CHANGE UP
SPECIMEN SOURCE: SIGNIFICANT CHANGE UP

## 2020-01-30 DIAGNOSIS — N64.89 OTHER SPECIFIED DISORDERS OF BREAST: ICD-10-CM

## 2020-06-09 PROBLEM — R92.8 ABNORMAL FINDING ON BREAST IMAGING: Status: ACTIVE | Noted: 2019-10-15

## 2020-06-11 ENCOUNTER — APPOINTMENT (OUTPATIENT)
Dept: SURGICAL ONCOLOGY | Facility: CLINIC | Age: 65
End: 2020-06-11
Payer: COMMERCIAL

## 2020-06-11 VITALS
HEIGHT: 70 IN | DIASTOLIC BLOOD PRESSURE: 103 MMHG | BODY MASS INDEX: 24.34 KG/M2 | SYSTOLIC BLOOD PRESSURE: 191 MMHG | HEART RATE: 74 BPM | OXYGEN SATURATION: 98 % | WEIGHT: 170 LBS

## 2020-06-11 DIAGNOSIS — R92.8 OTHER ABNORMAL AND INCONCLUSIVE FINDINGS ON DIAGNOSTIC IMAGING OF BREAST: ICD-10-CM

## 2020-06-11 PROCEDURE — 99214 OFFICE O/P EST MOD 30 MIN: CPT

## 2020-10-06 ENCOUNTER — RESULT REVIEW (OUTPATIENT)
Age: 65
End: 2020-10-06

## 2020-10-06 ENCOUNTER — OUTPATIENT (OUTPATIENT)
Dept: OUTPATIENT SERVICES | Facility: HOSPITAL | Age: 65
LOS: 1 days | End: 2020-10-06
Payer: COMMERCIAL

## 2020-10-06 ENCOUNTER — APPOINTMENT (OUTPATIENT)
Dept: ULTRASOUND IMAGING | Facility: IMAGING CENTER | Age: 65
End: 2020-10-06
Payer: COMMERCIAL

## 2020-10-06 ENCOUNTER — APPOINTMENT (OUTPATIENT)
Dept: MAMMOGRAPHY | Facility: IMAGING CENTER | Age: 65
End: 2020-10-06
Payer: COMMERCIAL

## 2020-10-06 DIAGNOSIS — Z98.890 OTHER SPECIFIED POSTPROCEDURAL STATES: Chronic | ICD-10-CM

## 2020-10-06 DIAGNOSIS — Z00.8 ENCOUNTER FOR OTHER GENERAL EXAMINATION: ICD-10-CM

## 2020-10-06 DIAGNOSIS — Z87.2 PERSONAL HISTORY OF DISEASES OF THE SKIN AND SUBCUTANEOUS TISSUE: Chronic | ICD-10-CM

## 2020-10-06 DIAGNOSIS — I89.9 NONINFECTIVE DISORDER OF LYMPHATIC VESSELS AND LYMPH NODES, UNSPECIFIED: Chronic | ICD-10-CM

## 2020-10-06 PROCEDURE — G0279: CPT | Mod: 26

## 2020-10-06 PROCEDURE — 76641 ULTRASOUND BREAST COMPLETE: CPT | Mod: 26,50

## 2020-10-06 PROCEDURE — G0279: CPT

## 2020-10-06 PROCEDURE — 77066 DX MAMMO INCL CAD BI: CPT | Mod: 26

## 2020-10-06 PROCEDURE — 77066 DX MAMMO INCL CAD BI: CPT

## 2020-10-06 PROCEDURE — 76641 ULTRASOUND BREAST COMPLETE: CPT

## 2020-10-29 ENCOUNTER — OUTPATIENT (OUTPATIENT)
Dept: OUTPATIENT SERVICES | Facility: HOSPITAL | Age: 65
LOS: 1 days | End: 2020-10-29
Payer: COMMERCIAL

## 2020-10-29 ENCOUNTER — RESULT REVIEW (OUTPATIENT)
Age: 65
End: 2020-10-29

## 2020-10-29 ENCOUNTER — APPOINTMENT (OUTPATIENT)
Dept: RADIOLOGY | Facility: IMAGING CENTER | Age: 65
End: 2020-10-29
Payer: COMMERCIAL

## 2020-10-29 ENCOUNTER — APPOINTMENT (OUTPATIENT)
Dept: MRI IMAGING | Facility: IMAGING CENTER | Age: 65
End: 2020-10-29
Payer: COMMERCIAL

## 2020-10-29 DIAGNOSIS — C43.59 MALIGNANT MELANOMA OF OTHER PART OF TRUNK: ICD-10-CM

## 2020-10-29 DIAGNOSIS — Z98.890 OTHER SPECIFIED POSTPROCEDURAL STATES: Chronic | ICD-10-CM

## 2020-10-29 DIAGNOSIS — Z87.2 PERSONAL HISTORY OF DISEASES OF THE SKIN AND SUBCUTANEOUS TISSUE: Chronic | ICD-10-CM

## 2020-10-29 DIAGNOSIS — I89.9 NONINFECTIVE DISORDER OF LYMPHATIC VESSELS AND LYMPH NODES, UNSPECIFIED: Chronic | ICD-10-CM

## 2020-10-29 PROCEDURE — A9585: CPT

## 2020-10-29 PROCEDURE — 73552 X-RAY EXAM OF FEMUR 2/>: CPT | Mod: 26,LT

## 2020-10-29 PROCEDURE — 77049 MRI BREAST C-+ W/CAD BI: CPT | Mod: 26

## 2020-10-29 PROCEDURE — C8937: CPT

## 2020-10-29 PROCEDURE — C8908: CPT

## 2020-10-29 PROCEDURE — 73552 X-RAY EXAM OF FEMUR 2/>: CPT

## 2020-11-12 ENCOUNTER — APPOINTMENT (OUTPATIENT)
Dept: SURGICAL ONCOLOGY | Facility: CLINIC | Age: 65
End: 2020-11-12

## 2020-11-17 ENCOUNTER — RESULT REVIEW (OUTPATIENT)
Age: 65
End: 2020-11-17

## 2020-11-17 ENCOUNTER — OUTPATIENT (OUTPATIENT)
Dept: OUTPATIENT SERVICES | Facility: HOSPITAL | Age: 65
LOS: 1 days | End: 2020-11-17
Payer: COMMERCIAL

## 2020-11-17 ENCOUNTER — APPOINTMENT (OUTPATIENT)
Dept: NUCLEAR MEDICINE | Facility: IMAGING CENTER | Age: 65
End: 2020-11-17
Payer: COMMERCIAL

## 2020-11-17 DIAGNOSIS — C43.59 MALIGNANT MELANOMA OF OTHER PART OF TRUNK: ICD-10-CM

## 2020-11-17 DIAGNOSIS — I89.9 NONINFECTIVE DISORDER OF LYMPHATIC VESSELS AND LYMPH NODES, UNSPECIFIED: Chronic | ICD-10-CM

## 2020-11-17 DIAGNOSIS — Z98.890 OTHER SPECIFIED POSTPROCEDURAL STATES: Chronic | ICD-10-CM

## 2020-11-17 DIAGNOSIS — Z87.2 PERSONAL HISTORY OF DISEASES OF THE SKIN AND SUBCUTANEOUS TISSUE: Chronic | ICD-10-CM

## 2020-11-17 PROCEDURE — 78306 BONE IMAGING WHOLE BODY: CPT

## 2020-11-17 PROCEDURE — 78830 RP LOCLZJ TUM SPECT W/CT 1: CPT | Mod: 26

## 2020-11-17 PROCEDURE — A9561: CPT

## 2020-11-17 PROCEDURE — 78306 BONE IMAGING WHOLE BODY: CPT | Mod: 26

## 2020-11-17 PROCEDURE — 78830 RP LOCLZJ TUM SPECT W/CT 1: CPT

## 2020-11-28 ENCOUNTER — LABORATORY RESULT (OUTPATIENT)
Age: 65
End: 2020-11-28

## 2020-11-28 ENCOUNTER — EMERGENCY (EMERGENCY)
Facility: HOSPITAL | Age: 65
LOS: 1 days | Discharge: HOME | End: 2020-11-28
Admitting: STUDENT IN AN ORGANIZED HEALTH CARE EDUCATION/TRAINING PROGRAM
Payer: COMMERCIAL

## 2020-11-28 VITALS
SYSTOLIC BLOOD PRESSURE: 197 MMHG | HEART RATE: 103 BPM | RESPIRATION RATE: 16 BRPM | DIASTOLIC BLOOD PRESSURE: 102 MMHG | HEIGHT: 68.5 IN | OXYGEN SATURATION: 100 %

## 2020-11-28 DIAGNOSIS — I89.9 NONINFECTIVE DISORDER OF LYMPHATIC VESSELS AND LYMPH NODES, UNSPECIFIED: Chronic | ICD-10-CM

## 2020-11-28 DIAGNOSIS — Z98.890 OTHER SPECIFIED POSTPROCEDURAL STATES: Chronic | ICD-10-CM

## 2020-11-28 DIAGNOSIS — Z87.2 PERSONAL HISTORY OF DISEASES OF THE SKIN AND SUBCUTANEOUS TISSUE: Chronic | ICD-10-CM

## 2020-11-28 LAB — SARS-COV-2 RNA SPEC QL NAA+PROBE: SIGNIFICANT CHANGE UP

## 2020-11-28 PROCEDURE — 99283 EMERGENCY DEPT VISIT LOW MDM: CPT

## 2020-11-28 NOTE — ED PROVIDER NOTE - PATIENT PORTAL LINK FT
You can access the FollowMyHealth Patient Portal offered by [facility] by registering at the following website: [email or web address]. By joining [facility]’s FollowMyHealth portal, you will also be able to view your health information using other applications (apps) compatible with our system.

## 2020-11-28 NOTE — ED PROVIDER NOTE - OBJECTIVE STATEMENT
65F presenting for Covid test. 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 States all PT employees in n95 and face shield and she herself had surgical mask on throughout the visit. Denies symptoms. Afebrile. No cough or other URI symptoms. No travels or other known sick contacts.

## 2020-11-28 NOTE — ED PROVIDER NOTE - NSFOLLOWUPINSTRUCTIONS_ED_ALL_ED_FT
Follow up with your PMD  You will receive a text/call with your results within the next 24 hours. Maintain strict quarantine precautions until you receive your results  Return to ED with new/worsening symptoms or anything else concerning to you

## 2020-12-04 ENCOUNTER — OUTPATIENT (OUTPATIENT)
Dept: OUTPATIENT SERVICES | Facility: HOSPITAL | Age: 65
LOS: 1 days | End: 2020-12-04
Payer: COMMERCIAL

## 2020-12-04 DIAGNOSIS — Z98.890 OTHER SPECIFIED POSTPROCEDURAL STATES: Chronic | ICD-10-CM

## 2020-12-04 DIAGNOSIS — Z11.59 ENCOUNTER FOR SCREENING FOR OTHER VIRAL DISEASES: ICD-10-CM

## 2020-12-04 DIAGNOSIS — Z87.2 PERSONAL HISTORY OF DISEASES OF THE SKIN AND SUBCUTANEOUS TISSUE: Chronic | ICD-10-CM

## 2020-12-04 DIAGNOSIS — I89.9 NONINFECTIVE DISORDER OF LYMPHATIC VESSELS AND LYMPH NODES, UNSPECIFIED: Chronic | ICD-10-CM

## 2020-12-04 LAB — SARS-COV-2 RNA SPEC QL NAA+PROBE: SIGNIFICANT CHANGE UP

## 2020-12-04 PROCEDURE — U0003: CPT

## 2020-12-07 ENCOUNTER — OUTPATIENT (OUTPATIENT)
Dept: OUTPATIENT SERVICES | Facility: HOSPITAL | Age: 65
LOS: 1 days | End: 2020-12-07
Payer: COMMERCIAL

## 2020-12-07 ENCOUNTER — APPOINTMENT (OUTPATIENT)
Dept: ULTRASOUND IMAGING | Facility: HOSPITAL | Age: 65
End: 2020-12-07
Payer: COMMERCIAL

## 2020-12-07 ENCOUNTER — RESULT REVIEW (OUTPATIENT)
Age: 65
End: 2020-12-07

## 2020-12-07 DIAGNOSIS — I89.9 NONINFECTIVE DISORDER OF LYMPHATIC VESSELS AND LYMPH NODES, UNSPECIFIED: Chronic | ICD-10-CM

## 2020-12-07 DIAGNOSIS — Z87.2 PERSONAL HISTORY OF DISEASES OF THE SKIN AND SUBCUTANEOUS TISSUE: Chronic | ICD-10-CM

## 2020-12-07 DIAGNOSIS — R04.2 HEMOPTYSIS: ICD-10-CM

## 2020-12-07 DIAGNOSIS — Z98.890 OTHER SPECIFIED POSTPROCEDURAL STATES: Chronic | ICD-10-CM

## 2020-12-07 DIAGNOSIS — Z85.820 PERSONAL HISTORY OF MALIGNANT MELANOMA OF SKIN: ICD-10-CM

## 2020-12-07 PROCEDURE — 76642 ULTRASOUND BREAST LIMITED: CPT | Mod: 26,LT

## 2020-12-07 PROCEDURE — 76642 ULTRASOUND BREAST LIMITED: CPT

## 2020-12-07 NOTE — REVIEW OF SYSTEMS
[Negative] : Endocrine [FreeTextEntry6] : pulmonary disease [de-identified] : melanoma [FreeTextEntry1] : Increased risk of breast cancer

## 2020-12-07 NOTE — REASON FOR VISIT
[Follow-Up Visit] : a follow-up visit for [Other: _____] : [unfilled] [FreeTextEntry2] : Stage III melanoma left chest, interval breast examination.

## 2020-12-07 NOTE — PHYSICAL EXAM
[Normal] : supple, no neck mass and thyroid not enlarged [Normal Neck Lymph Nodes] : normal neck lymph nodes  [Normal Supraclavicular Lymph Nodes] : normal supraclavicular lymph nodes [Normal Axillary Lymph Nodes] : normal axillary lymph nodes [Normal] : full range of motion and no deformities appreciated [de-identified] : Groins not examined [de-identified] : below

## 2020-12-07 NOTE — ASSESSMENT
[FreeTextEntry1] : Clinically doing well.\par \par Annual mammogram and breast ultrasound will be October 2020, prescriptions entered.\par \par Consider a followup MRI, at an appropriate interval (breast cancer risk score 20.7)............................\par \par 01-23-20.\par CT chest/abdomen/pelvis at 450.\par Stable, small, pulmonary nodules, largest 8 mm.\par We'll repeat January 2021... (Below)\par \par \par Clinically doing well.\par If no problems we will see her in another 6 months, sooner if needed\par \par \par No evidence of metastatic disease in the abdomen or pelvis\par \par \par \par 10-12-20.\par We spoke.\par A second sister was diagnosed with breast cancer age 64, December 2019.\par Therefore, we will do her breast MRI presently, a one year interval, prescription submitted.\par Also, 10-6-20 she had her bilateral mammogram and sonogram at 450: BI-RADS 2.\par There are 2 postoperative seroma is on the left, which is symptomatic, which she may want drained after the MRI, we will speak after that study... (Below)\par \par \par 10/22/20:\par I returned her call.\par For the past 2 weeks she has been experiencing "bone pain" in left thigh.\par No preceding injury or strain.\par No provocative or palliative factors.\par Pain does not radiate.\par I am arranging for x-rays of her left femur.\par Her breast MRI is presently scheduled for October 29, 2020.\par \par \par 11-3-20.\par We spoke.\par She had imaging on October 29, 2020:\par 1.  Left femur x-ray, no suspicious findings.\par She still has pain in that extremity.\par Bone scan ordered.\par 2.  Follow-up CT chest is due in January 2021, that prescription was entered today also.\par 3.  She is interested in having the left breast and axillary seromas drained, I told her that we would address this once we have completed the evaluation of her bone pain...................\par \par \par 11-23-20.\par We spoke.\par 1.  November 17, 2020 bone scan: No evidence of metastatic disease.\par 2.  She would like me to arrange for her to have sonogram guided aspiration of her left axillary seromas, with possible sclerotherapy, through interventional radiology at [facility].\par Paperwork submitted\par \par \par 12-7-20.\par She presented to [facility] today for reaspiration, and possible sclerotherapy for the left axillary seroma.\par However, on imaging, there was minimal fluid, and no suspicious findings.\par Therefore, no intervention performed.\par Discussed with radiology (Dr. Gerson Gaines)

## 2020-12-07 NOTE — HISTORY OF PRESENT ILLNESS
[de-identified] : 65 -year-old lady who 2019 had wire localization of a left axillary imaging abnormality, which on preoperative needle biopsy was benign but discordant.\par \par Final pathology:\par Tissue, tail of left breast.\par Breast and fibroadipose tissue with prior procedures changes, fibrocystic change, usual ductal hyperplasia, sclerosing adenosis, apocrine metaplasia, PASH, and benign epithelial calcifications.\par Intramammary reactive lymph node with prior procedure site changes including cystic degeneration. \par Accompanying skin was normal.\par \par She had a plastics closure by Dr. Eugenia DISLA\par She developed problems with a chronic seroma.\par Surgical revision is pending 2020, with him, at [facility]\par \par There was an ipsilateral, enlarging left breast nodule, which was benign and concordant on needle bx; one-year followup left breast ultrasound recommended.\par \par There was also unexplained skin thickening which prompted the skin biopsy which accompanied the excisional biopsy described above - benign.\par \par There were no additional suspicious findings on her preoperative, 2019, breast MRI.\par \par \par +FH:\par Her mother had Ovarian cancer.\par A sister had breast cancer in her 50s\par (Dec 2019 another sister dx with breast cancer @64)\par \par Her own genetic testing demonstrates NO deleterious mutation.\par \par No other previous breast biopsies.\par \par \par Menarche at 14\par  2, para 2, first at 30.\par Natural menopause at 52.\par No exogenous hormones\par \par Her breast cancer risk score (calculated at 19): 20.7\par \par \par \par History of stage III melanoma of the LEFT CHEST.\par \par 2018: cellulitic changes in the left upper extremity.\par Not preceded by any kind of injury.\par No constitutional signs or symptoms.\par Previous similar episodes were treated with Keflex, but now she is allergic.\par Treated with doxycycline.\par \par \par 2018 she reported some headaches and double vision.\par She had no other specific or constitutional neurologic signs or symptoms.\par 2018 brain MRI performed at our location demonstrated no abnormalities or interval changes.\par \par 2010: 3.0 mm melanoma LEFT CHEST  was resected with negative margins. \par Reconstruction was by Dr. Shaw Patterson\par Single +SLN => a completion dissection which showed NO additional disease in 24 lymph nodes identified by the pathologist.\par \par +Adjuvant interferon under the supervision of Dr. Bry Elmore.\par \par She goes to the [facility] lymphed[facility] for treatment, currently with a gauntlet and glove, for her left upper extremity.\par She had lymph node transfer surgery, 2018 by Dr. Longo - Unfortunately, no benefit.\par Consideration is being given to division of some of the latissimus dorsi muscle to allow for increased mobility of her upper extremity.\par \par +FH\par Her father had melanoma.\par She has tested negative on the Melaris assay.\par \par Her dermatologist is Dr. Mike WHITLEY,.\par Reminded.: Was postponed because of the corona virus pandemic\par \par Her gynecologist is Dr. Lucia JACKSON\par She is overdue(Still), and aware.- Was postponed because of the corona virus pandemic\par \par Her last eye examination was  2019 with Dr. Cerna.\par Due to care home, we changed Dr. Willi Crowley, info given\par \par Her internist is Dr. Frank AMICO.\par \par Pulmonary: Dr. Karuna KOENIG\par \par No pacemaker or defibrillator.\par No anticoagulants.\par \par Current medications:;.-  ProAir inhaler, recently diagnosed with COPD felt to be due to secondary smoke from her mother.\par \par \par Will check with Dr Albert about her next C-scope, She is aware that she is overdue.- still - Reminded..\par Was postponed because of the corona virus pandemic\par \par Had  left CW scar revision and fat-grafting by [facility]

## 2020-12-14 NOTE — ASU DISCHARGE PLAN (ADULT/PEDIATRIC). - NOTIFY
Subjective   Marcelina Reyes Swelling that continues/Pain not relieved by Medications/Fever greater than 101/Increased Irritability or Sluggishness/Persistent Nausea and Vomiting/Unable to Urinate/Excessive Diarrhea/Inability to Tolerate Liquids or Foods/Bleeding that does not stop/Numbness, color, or temperature change to extremity/Numbness, tingling

## 2020-12-31 ENCOUNTER — APPOINTMENT (OUTPATIENT)
Dept: GASTROENTEROLOGY | Facility: CLINIC | Age: 65
End: 2020-12-31
Payer: COMMERCIAL

## 2020-12-31 VITALS
HEIGHT: 70 IN | SYSTOLIC BLOOD PRESSURE: 198 MMHG | BODY MASS INDEX: 24.34 KG/M2 | HEART RATE: 105 BPM | RESPIRATION RATE: 18 BRPM | OXYGEN SATURATION: 98 % | TEMPERATURE: 97.1 F | DIASTOLIC BLOOD PRESSURE: 94 MMHG | WEIGHT: 170 LBS

## 2020-12-31 DIAGNOSIS — Z86.010 PERSONAL HISTORY OF COLONIC POLYPS: ICD-10-CM

## 2020-12-31 PROBLEM — R59.9 ADENOPATHY: Status: ACTIVE | Noted: 2017-10-23

## 2020-12-31 PROCEDURE — 99244 OFF/OP CNSLTJ NEW/EST MOD 40: CPT

## 2020-12-31 PROCEDURE — 99072 ADDL SUPL MATRL&STAF TM PHE: CPT

## 2020-12-31 RX ORDER — SODIUM SULFATE, POTASSIUM SULFATE, MAGNESIUM SULFATE 17.5; 3.13; 1.6 G/ML; G/ML; G/ML
17.5-3.13-1.6 SOLUTION, CONCENTRATE ORAL
Qty: 1 | Refills: 0 | Status: ACTIVE | COMMUNITY
Start: 2020-12-31 | End: 1900-01-01

## 2020-12-31 NOTE — HISTORY OF PRESENT ILLNESS
[de-identified] : 65-year-old female, history of colon polyps presents for surveillance evaluation\par Colonoscopy 2012 with large adenoma 2.2 cm\par Short-term follow-up without any adenomatous polyp early 2013\par Patient denies any significant interval complaints\par Fluctuation of bowel habit, stool consistency but no bleeding or incontinence\par Denies any constipation\par No family history of colon cancer \par denies reflux or difficulty swallowing\par \par Denies history of coronary disease congestive heart failure or dysrhythmia\par \par Social history non-smoker, works as a nurse at [employer]\par \par \par 
Improved

## 2020-12-31 NOTE — ASSESSMENT
[FreeTextEntry1] : 65-year-old female history of adenomatous polyps of the colon due for surveillance\par \par Plan\par Indication risks benefits and alternatives to colonoscopy reviewed\par Patient agreeable to examination\par \par Patient referred by Dr. Frank Amico

## 2021-01-15 DIAGNOSIS — Z01.818 ENCOUNTER FOR OTHER PREPROCEDURAL EXAMINATION: ICD-10-CM

## 2021-01-16 ENCOUNTER — APPOINTMENT (OUTPATIENT)
Dept: DISASTER EMERGENCY | Facility: CLINIC | Age: 66
End: 2021-01-16

## 2021-01-17 LAB — SARS-COV-2 N GENE NPH QL NAA+PROBE: NOT DETECTED

## 2021-01-20 ENCOUNTER — APPOINTMENT (OUTPATIENT)
Dept: GASTROENTEROLOGY | Facility: AMBULATORY MEDICAL SERVICES | Age: 66
End: 2021-01-20
Payer: COMMERCIAL

## 2021-01-20 PROCEDURE — 45385 COLONOSCOPY W/LESION REMOVAL: CPT

## 2021-01-25 ENCOUNTER — NON-APPOINTMENT (OUTPATIENT)
Age: 66
End: 2021-01-25

## 2021-01-26 ENCOUNTER — RESULT REVIEW (OUTPATIENT)
Age: 66
End: 2021-01-26

## 2021-01-26 ENCOUNTER — APPOINTMENT (OUTPATIENT)
Dept: CT IMAGING | Facility: IMAGING CENTER | Age: 66
End: 2021-01-26
Payer: COMMERCIAL

## 2021-01-26 ENCOUNTER — OUTPATIENT (OUTPATIENT)
Dept: OUTPATIENT SERVICES | Facility: HOSPITAL | Age: 66
LOS: 1 days | End: 2021-01-26
Payer: COMMERCIAL

## 2021-01-26 DIAGNOSIS — Z00.8 ENCOUNTER FOR OTHER GENERAL EXAMINATION: ICD-10-CM

## 2021-01-26 DIAGNOSIS — Z98.890 OTHER SPECIFIED POSTPROCEDURAL STATES: Chronic | ICD-10-CM

## 2021-01-26 DIAGNOSIS — I89.9 NONINFECTIVE DISORDER OF LYMPHATIC VESSELS AND LYMPH NODES, UNSPECIFIED: Chronic | ICD-10-CM

## 2021-01-26 DIAGNOSIS — Z87.2 PERSONAL HISTORY OF DISEASES OF THE SKIN AND SUBCUTANEOUS TISSUE: Chronic | ICD-10-CM

## 2021-01-26 DIAGNOSIS — C43.59 MALIGNANT MELANOMA OF OTHER PART OF TRUNK: ICD-10-CM

## 2021-01-26 PROCEDURE — 71250 CT THORAX DX C-: CPT

## 2021-01-26 PROCEDURE — 71250 CT THORAX DX C-: CPT | Mod: 26

## 2021-02-04 ENCOUNTER — APPOINTMENT (OUTPATIENT)
Dept: SURGICAL ONCOLOGY | Facility: CLINIC | Age: 66
End: 2021-02-04
Payer: COMMERCIAL

## 2021-02-04 VITALS
WEIGHT: 170 LBS | BODY MASS INDEX: 24.34 KG/M2 | OXYGEN SATURATION: 97 % | HEART RATE: 97 BPM | SYSTOLIC BLOOD PRESSURE: 228 MMHG | HEIGHT: 70 IN | DIASTOLIC BLOOD PRESSURE: 128 MMHG

## 2021-02-04 PROCEDURE — 99215 OFFICE O/P EST HI 40 MIN: CPT

## 2021-02-04 PROCEDURE — 99072 ADDL SUPL MATRL&STAF TM PHE: CPT

## 2021-02-18 ENCOUNTER — OUTPATIENT (OUTPATIENT)
Dept: OUTPATIENT SERVICES | Facility: HOSPITAL | Age: 66
LOS: 1 days | End: 2021-02-18
Payer: COMMERCIAL

## 2021-02-18 ENCOUNTER — RESULT REVIEW (OUTPATIENT)
Age: 66
End: 2021-02-18

## 2021-02-18 ENCOUNTER — APPOINTMENT (OUTPATIENT)
Dept: MAMMOGRAPHY | Facility: IMAGING CENTER | Age: 66
End: 2021-02-18
Payer: COMMERCIAL

## 2021-02-18 ENCOUNTER — APPOINTMENT (OUTPATIENT)
Dept: ULTRASOUND IMAGING | Facility: IMAGING CENTER | Age: 66
End: 2021-02-18
Payer: COMMERCIAL

## 2021-02-18 DIAGNOSIS — Z98.890 OTHER SPECIFIED POSTPROCEDURAL STATES: Chronic | ICD-10-CM

## 2021-02-18 DIAGNOSIS — N64.59 OTHER SIGNS AND SYMPTOMS IN BREAST: ICD-10-CM

## 2021-02-18 DIAGNOSIS — I89.9 NONINFECTIVE DISORDER OF LYMPHATIC VESSELS AND LYMPH NODES, UNSPECIFIED: Chronic | ICD-10-CM

## 2021-02-18 DIAGNOSIS — Z87.2 PERSONAL HISTORY OF DISEASES OF THE SKIN AND SUBCUTANEOUS TISSUE: Chronic | ICD-10-CM

## 2021-02-18 PROCEDURE — 77065 DX MAMMO INCL CAD UNI: CPT

## 2021-02-18 PROCEDURE — G0279: CPT | Mod: 26

## 2021-02-18 PROCEDURE — G0279: CPT

## 2021-02-18 PROCEDURE — 77065 DX MAMMO INCL CAD UNI: CPT | Mod: 26,LT

## 2021-02-18 PROCEDURE — 76641 ULTRASOUND BREAST COMPLETE: CPT

## 2021-02-18 PROCEDURE — 76641 ULTRASOUND BREAST COMPLETE: CPT | Mod: 26,LT

## 2021-02-23 NOTE — ASSESSMENT
[FreeTextEntry1] : The cause of her bruising in the upper part of the left breast is not apparent on today's history and physical.  \par It has been more than 3 months since her most recent mammogram and ultrasound, so the studies will be repeated presently, prescriptions entered.  \par If no imaging abnormalities, and no new signs or symptoms, I have asked her to see me in the summer, sooner if needed.\par \par January 2021:\par CT chest at 450:\par No suspicious findings, or interval change.\par \par Annual mammogram and breast ultrasound  October 2020, at 450: BI-RADS 2.\par \par October 2020: Followup breast MRI, (breast cancer risk score 20.7): BI-RADS 2\par \par \par Clinically doing well.\par If no problems we will see her in another 6 months, sooner if needed\par \par \par 02/23/21:\par I returned her call, but had to leave a voicemail and a message on her home machine.\par February 18, 2021: Diagnostic left mammogram and sonogram at 450: BI-RADS 2.\par No suspicious findings noted radiographically or on ultrasound.\par \par \par 10/22/20:\par I returned her call.\par For the past 2 weeks she has been experiencing "bone pain" in left thigh.\par No preceding injury or strain.\par No provocative or palliative factors.\par Pain does not radiate.\par I am arranging for x-rays of her left femur.\par \par \par 11-3-20.\par We spoke.\par She had imaging on October 29, 2020:\par 1.  Left femur x-ray, no suspicious findings.\par She still has pain in that extremity.\par Bone scan ordered.\par \par \par \par 11-23-20.\par We spoke.\par  November 17, 2020 bone scan: No evidence of metastatic disease.\par \par \par \par 12-7-20.\par She presented to [facility] today for re-aspiration, and possible sclerotherapy for the left axillary seroma.\par However, on imaging, there was minimal fluid, and no suspicious findings.\par Therefore, no intervention performed.\par Discussed with radiology (Dr. Gerson Gaines)

## 2021-02-23 NOTE — REVIEW OF SYSTEMS
[Negative] : Endocrine [FreeTextEntry6] : COPD [FreeTextEntry8] : Colon polyps [de-identified] : Melanoma [FreeTextEntry1] : Increased risk of breast cancer

## 2021-02-23 NOTE — REASON FOR VISIT
[Follow-Up Visit] : a follow-up visit for [Other: _____] : [unfilled] [FreeTextEntry2] : Stage III melanoma of the left chest, benign breast disease

## 2021-02-23 NOTE — PHYSICAL EXAM
[Normal] : supple, no neck mass and thyroid not enlarged [Normal Neck Lymph Nodes] : normal neck lymph nodes  [Normal Supraclavicular Lymph Nodes] : normal supraclavicular lymph nodes [Normal Axillary Lymph Nodes] : normal axillary lymph nodes [Normal] : full range of motion and no deformities appreciated [de-identified] : Groins not examined [de-identified] : below

## 2021-02-23 NOTE — HISTORY OF PRESENT ILLNESS
[de-identified] : 65-year-old lady resents with a 2-week history of unexplained pain, tenderness, and bruising in the upper outer quadrant of the left breast.\par \par She does not take any anticoagulants.\par She takes no steroids presently.\par No history of bleeding diatheses or symptoms related to either\par \par \par \par 65 year-old lady who 2019 had wire localized excision of a left axillary imaging abnormality, which on preoperative needle biopsy was benign but discordant.\par \par Final pathology:\par Tissue, tail of left breast.\par Breast and fibroadipose tissue with prior procedures changes, fibrocystic change, usual ductal hyperplasia, sclerosing adenosis, apocrine metaplasia, PASH, and benign epithelial calcifications.\par Intramammary reactive lymph node with prior procedure site changes including cystic degeneration. \par Accompanying skin was normal.\par \par She had a plastics closure by Dr. Euegnia DISLA\par + Chronic seroma.\par Surgical revision: 2020, with him, at [facility]\par \par There was an ipsilateral, enlarging left breast nodule, which was benign and concordant on needle bx; one-year followup left breast ultrasound recommended.\par \par There was also unexplained skin thickening which prompted the skin biopsy which accompanied the excisional biopsy described above - benign.\par \par There were no additional suspicious findings on her preoperative, 2019, breast MRI.\par \par \par +FH:\par Her mother had Ovarian cancer.\par A sister had breast cancer in her 50s\par (Dec 2019 another sister dx with breast cancer @64)\par \par Her own genetic testing demonstrates NO deleterious mutation.\par \par No other previous breast biopsies.\par \par \par Menarche at 14\par  2, para 2, first at 30.\par Natural menopause at 52.\par No exogenous hormones\par \par Her breast cancer risk score (calculated at 19): 20.7\par \par \par History of stage III melanoma of the LEFT CHEST.\par \par 2018: cellulitic changes in the left upper extremity.\par Not preceded by any kind of injury.\par No constitutional signs or symptoms.\par Previous similar episodes were treated with Keflex, but now she is allergic.\par Treated with doxycycline.\par \par \par 2018 she reported some headaches and double vision.\par She had no other specific or constitutional neurologic signs or symptoms.\par 2018 brain MRI performed at our location demonstrated no abnormalities or interval changes.\par \par 2010: 3.0 mm melanoma LEFT CHEST  was resected with negative margins. \par Reconstruction was by Dr. Shaw Patterson\par Single +SLN => a completion dissection which showed NO additional disease in 24 lymph nodes identified by the pathologist.\par \par +Adjuvant interferon under the supervision of Dr. Bry Elmore.\par \par She goes to the [facility] lymphedema [facility] for treatment, currently with a gauntlet and glove, for her left upper extremity.\par She had lymph node transfer surgery, 2018 by Dr. Longo - Unfortunately, no benefit.\par Consideration is being given to division of some of the latissimus dorsi muscle to allow for increased mobility of her upper extremity.\par \par +FH\par Her father had melanoma.\par She has tested negative on the Melaris assay.\par \par Her dermatologist is Dr. Chloe SY.\par 2020 biopsy of the inner right arm was benign.\par She used to see Dr. Mike Mccall,.\par Reminded.: Was postponed because of the corona virus pandemic\par \par \par Her gynecologist is Dr. Lucia JACKSON\par She is overdue(Still), and aware.- Was postponed because of the corona virus pandemic\par \par \par Her last eye examination was  2019 with Dr. Cerna.\par Due to residential, we changed Dr. Willi Crowley, info given\par \par \par Her internist is Dr. Frank AMICO.\par \par Pulmonary: Dr. Karuna KOENIG\par \par No pacemaker or defibrillator.\par No anticoagulants.\par \par Current medications:;.-  ProAir inhaler, recently diagnosed with COPD felt to be due to secondary smoke from her mother.\par \par \par Colonoscopy: Dr. Earle PATEL.\par 2021 okay x3 years\par \par \par Had  left CW scar revision and fat-grafting by Light

## 2021-04-08 ENCOUNTER — RESULT REVIEW (OUTPATIENT)
Age: 66
End: 2021-04-08

## 2021-04-08 ENCOUNTER — OUTPATIENT (OUTPATIENT)
Dept: OUTPATIENT SERVICES | Facility: HOSPITAL | Age: 66
LOS: 1 days | End: 2021-04-08
Payer: COMMERCIAL

## 2021-04-08 VITALS
RESPIRATION RATE: 14 BRPM | SYSTOLIC BLOOD PRESSURE: 131 MMHG | HEART RATE: 69 BPM | WEIGHT: 188.27 LBS | DIASTOLIC BLOOD PRESSURE: 80 MMHG | TEMPERATURE: 98 F | OXYGEN SATURATION: 98 % | HEIGHT: 68.5 IN

## 2021-04-08 DIAGNOSIS — Z85.3 PERSONAL HISTORY OF MALIGNANT NEOPLASM OF BREAST: ICD-10-CM

## 2021-04-08 DIAGNOSIS — Z98.890 OTHER SPECIFIED POSTPROCEDURAL STATES: Chronic | ICD-10-CM

## 2021-04-08 DIAGNOSIS — N64.4 MASTODYNIA: ICD-10-CM

## 2021-04-08 DIAGNOSIS — Z01.818 ENCOUNTER FOR OTHER PREPROCEDURAL EXAMINATION: ICD-10-CM

## 2021-04-08 DIAGNOSIS — Z87.2 PERSONAL HISTORY OF DISEASES OF THE SKIN AND SUBCUTANEOUS TISSUE: Chronic | ICD-10-CM

## 2021-04-08 DIAGNOSIS — M79.2 NEURALGIA AND NEURITIS, UNSPECIFIED: ICD-10-CM

## 2021-04-08 DIAGNOSIS — I89.9 NONINFECTIVE DISORDER OF LYMPHATIC VESSELS AND LYMPH NODES, UNSPECIFIED: Chronic | ICD-10-CM

## 2021-04-08 LAB
ANION GAP SERPL CALC-SCNC: 10 MMOL/L — SIGNIFICANT CHANGE UP (ref 5–17)
BUN SERPL-MCNC: 14 MG/DL — SIGNIFICANT CHANGE UP (ref 7–23)
CALCIUM SERPL-MCNC: 9.6 MG/DL — SIGNIFICANT CHANGE UP (ref 8.4–10.5)
CHLORIDE SERPL-SCNC: 105 MMOL/L — SIGNIFICANT CHANGE UP (ref 96–108)
CO2 SERPL-SCNC: 25 MMOL/L — SIGNIFICANT CHANGE UP (ref 22–31)
CREAT SERPL-MCNC: 0.64 MG/DL — SIGNIFICANT CHANGE UP (ref 0.5–1.3)
GLUCOSE SERPL-MCNC: 108 MG/DL — HIGH (ref 70–99)
HCT VFR BLD CALC: 42.6 % — SIGNIFICANT CHANGE UP (ref 34.5–45)
HGB BLD-MCNC: 14.4 G/DL — SIGNIFICANT CHANGE UP (ref 11.5–15.5)
MCHC RBC-ENTMCNC: 33.6 PG — SIGNIFICANT CHANGE UP (ref 27–34)
MCHC RBC-ENTMCNC: 33.8 GM/DL — SIGNIFICANT CHANGE UP (ref 32–36)
MCV RBC AUTO: 99.5 FL — SIGNIFICANT CHANGE UP (ref 80–100)
NRBC # BLD: 0 /100 WBCS — SIGNIFICANT CHANGE UP (ref 0–0)
PLATELET # BLD AUTO: 274 K/UL — SIGNIFICANT CHANGE UP (ref 150–400)
POTASSIUM SERPL-MCNC: 3.8 MMOL/L — SIGNIFICANT CHANGE UP (ref 3.5–5.3)
POTASSIUM SERPL-SCNC: 3.8 MMOL/L — SIGNIFICANT CHANGE UP (ref 3.5–5.3)
RBC # BLD: 4.28 M/UL — SIGNIFICANT CHANGE UP (ref 3.8–5.2)
RBC # FLD: 11.9 % — SIGNIFICANT CHANGE UP (ref 10.3–14.5)
SODIUM SERPL-SCNC: 140 MMOL/L — SIGNIFICANT CHANGE UP (ref 135–145)
WBC # BLD: 6.75 K/UL — SIGNIFICANT CHANGE UP (ref 3.8–10.5)
WBC # FLD AUTO: 6.75 K/UL — SIGNIFICANT CHANGE UP (ref 3.8–10.5)

## 2021-04-08 PROCEDURE — 71046 X-RAY EXAM CHEST 2 VIEWS: CPT | Mod: 26

## 2021-04-08 PROCEDURE — 93010 ELECTROCARDIOGRAM REPORT: CPT | Mod: NC

## 2021-04-08 PROCEDURE — 71046 X-RAY EXAM CHEST 2 VIEWS: CPT

## 2021-04-08 PROCEDURE — 80048 BASIC METABOLIC PNL TOTAL CA: CPT

## 2021-04-08 PROCEDURE — 93005 ELECTROCARDIOGRAM TRACING: CPT

## 2021-04-08 PROCEDURE — 36415 COLL VENOUS BLD VENIPUNCTURE: CPT

## 2021-04-08 PROCEDURE — 85027 COMPLETE CBC AUTOMATED: CPT

## 2021-04-08 PROCEDURE — G0463: CPT

## 2021-04-08 RX ORDER — SODIUM CHLORIDE 9 MG/ML
1000 INJECTION, SOLUTION INTRAVENOUS
Refills: 0 | Status: DISCONTINUED | OUTPATIENT
Start: 2021-04-20 | End: 2021-05-05

## 2021-04-08 NOTE — H&P PST ADULT - NSICDXFAMILYHX_GEN_ALL_CORE_FT
FAMILY HISTORY:  Father  Still living? No  Family history of atrial fibrillation, Age at diagnosis: Age Unknown  Family history of hypertension, Age at diagnosis: Age Unknown  Family history of melanoma, Age at diagnosis: Age Unknown  Family history of type 2 diabetes mellitus, Age at diagnosis: Age Unknown    Mother  Still living? No  Family history of hypertension, Age at diagnosis: Age Unknown  FH: ovarian cancer, Age at diagnosis: Age Unknown    Sibling  Still living? Yes, Estimated age: 61-70  Family history of breast cancer, Age at diagnosis: Age Unknown  Family history of breast cancer, Age at diagnosis: Age Unknown  Family history of atrial fibrillation, Age at diagnosis: Age Unknown

## 2021-04-08 NOTE — H&P PST ADULT - NSICDXPASTSURGICALHX_GEN_ALL_CORE_FT
PAST SURGICAL HISTORY:  Disorder of lymphatics lymph node dissection left axilla 2010    H/O knee surgery 20 years ago, right    H/O lymph node excision lymph node transplant 2018 right groin to left axillae    H/O shoulder surgery left shoulder muscle release, 2018    H/O skin graft fat graft  chest area left 2x   2015 and 2016  for pain control as per patient    History of excision of lesion left side of chest  melanoma 2010     PAST SURGICAL HISTORY:  Disorder of lymphatics lymph node dissection left axilla 2010. NO IVs, BPs or venipuncture left arm    H/O knee surgery 20 years ago, right    H/O lymph node excision lymph node transplant 2018 right groin to left axillae    H/O shoulder surgery left shoulder muscle release, 2018    H/O skin graft fat graft  chest area left 2x   2015 and 2016  for pain control as per patient    History of excision of lesion left side of chest  melanoma 2010

## 2021-04-08 NOTE — H&P PST ADULT - NSICDXPROBLEM_GEN_ALL_CORE_FT
PROBLEM DIAGNOSES  Problem: Pain of left breast  Assessment and Plan: left chest neurolysis and nerve grafting. medical clearance requested. use proAir AM of surgery. surgical wash instructions reviewed and verbalized understanding. covid PCR appt scheduled for 4/17/21

## 2021-04-08 NOTE — H&P PST ADULT - NSANTHOSAYNRD_GEN_A_CORE
neck 15 inches/No. [employer] screening performed.  STOP BANG Legend: 0-2 = LOW Risk; 3-4 = INTERMEDIATE Risk; 5-8 = HIGH Risk

## 2021-04-08 NOTE — H&P PST ADULT - HISTORY OF PRESENT ILLNESS
64 yo female presents s/p left breast lumpectomy 2019 and reconstructive left breast surgery with complaints of breast pain since the lumpectomy. 66 yo female presents s/p left breast lumpectomy 2019 and reconstructive left breast surgery with complaints of breast pain and since the lumpectomy.

## 2021-04-08 NOTE — H&P PST ADULT - NSICDXPASTMEDICALHX_GEN_ALL_CORE_FT
PAST MEDICAL HISTORY:  Colon polyps precancerous, last colonoscopy January 2021    COVID-19 December 2020, mild symptoms and no hospitalization    COVID-19 vaccine series completed moderna, second dose 2/9/21    History of COPD secondary to second hand smoke exposure    Left breast mass benign 2020    Lymphedema of arm left 2010 post lymph node excision left axilla    Melanoma left side of chest 2010 /excision of lesion and lymph node excision with immunotherapy  2010     PAST MEDICAL HISTORY:  Colon polyps precancerous, last colonoscopy January 2021    COVID-19 December 2020, mild symptoms and no hospitalization    COVID-19 vaccine series completed moderna, second dose 2/9/21    History of COPD secondary to second hand smoke exposure    Left breast mass benign 2020    Lymphedema of arm left 2010 post lymph node excision left axilla    Melanoma left side of chest 2010 /excision of lesion and lymph node excision with immunotherapy  2010    Pain of left breast

## 2021-04-17 ENCOUNTER — APPOINTMENT (OUTPATIENT)
Dept: DISASTER EMERGENCY | Facility: CLINIC | Age: 66
End: 2021-04-17

## 2021-04-17 LAB — SARS-COV-2 N GENE NPH QL NAA+PROBE: NOT DETECTED

## 2021-04-19 ENCOUNTER — TRANSCRIPTION ENCOUNTER (OUTPATIENT)
Age: 66
End: 2021-04-19

## 2021-04-20 ENCOUNTER — RESULT REVIEW (OUTPATIENT)
Age: 66
End: 2021-04-20

## 2021-04-20 ENCOUNTER — OUTPATIENT (OUTPATIENT)
Dept: OUTPATIENT SERVICES | Facility: HOSPITAL | Age: 66
LOS: 1 days | End: 2021-04-20
Payer: COMMERCIAL

## 2021-04-20 VITALS
RESPIRATION RATE: 20 BRPM | OXYGEN SATURATION: 96 % | TEMPERATURE: 97 F | SYSTOLIC BLOOD PRESSURE: 131 MMHG | DIASTOLIC BLOOD PRESSURE: 62 MMHG

## 2021-04-20 VITALS
RESPIRATION RATE: 18 BRPM | HEIGHT: 68.5 IN | SYSTOLIC BLOOD PRESSURE: 176 MMHG | WEIGHT: 188.27 LBS | OXYGEN SATURATION: 99 % | HEART RATE: 75 BPM | DIASTOLIC BLOOD PRESSURE: 84 MMHG | TEMPERATURE: 99 F

## 2021-04-20 DIAGNOSIS — Z85.3 PERSONAL HISTORY OF MALIGNANT NEOPLASM OF BREAST: ICD-10-CM

## 2021-04-20 DIAGNOSIS — Z98.890 OTHER SPECIFIED POSTPROCEDURAL STATES: Chronic | ICD-10-CM

## 2021-04-20 DIAGNOSIS — Z87.2 PERSONAL HISTORY OF DISEASES OF THE SKIN AND SUBCUTANEOUS TISSUE: Chronic | ICD-10-CM

## 2021-04-20 DIAGNOSIS — I89.9 NONINFECTIVE DISORDER OF LYMPHATIC VESSELS AND LYMPH NODES, UNSPECIFIED: Chronic | ICD-10-CM

## 2021-04-20 DIAGNOSIS — M79.2 NEURALGIA AND NEURITIS, UNSPECIFIED: ICD-10-CM

## 2021-04-20 PROCEDURE — 21555 EXC NECK LES SC < 3 CM: CPT

## 2021-04-20 PROCEDURE — 88304 TISSUE EXAM BY PATHOLOGIST: CPT | Mod: 26

## 2021-04-20 PROCEDURE — 88304 TISSUE EXAM BY PATHOLOGIST: CPT

## 2021-04-20 PROCEDURE — 64620 INJECTION TREATMENT OF NERVE: CPT | Mod: XS

## 2021-04-20 PROCEDURE — C1889: CPT

## 2021-04-20 RX ORDER — HYDROMORPHONE HYDROCHLORIDE 2 MG/ML
0.5 INJECTION INTRAMUSCULAR; INTRAVENOUS; SUBCUTANEOUS
Refills: 0 | Status: DISCONTINUED | OUTPATIENT
Start: 2021-04-20 | End: 2021-04-20

## 2021-04-20 RX ORDER — SODIUM CHLORIDE 9 MG/ML
1000 INJECTION, SOLUTION INTRAVENOUS
Refills: 0 | Status: DISCONTINUED | OUTPATIENT
Start: 2021-04-20 | End: 2021-05-05

## 2021-04-20 RX ORDER — OXYCODONE HYDROCHLORIDE 5 MG/1
5 TABLET ORAL ONCE
Refills: 0 | Status: DISCONTINUED | OUTPATIENT
Start: 2021-04-20 | End: 2021-04-20

## 2021-04-20 RX ORDER — ONDANSETRON 8 MG/1
4 TABLET, FILM COATED ORAL ONCE
Refills: 0 | Status: DISCONTINUED | OUTPATIENT
Start: 2021-04-20 | End: 2021-04-20

## 2021-04-20 RX ORDER — OXYCODONE HYDROCHLORIDE 5 MG/1
5 TABLET ORAL EVERY 4 HOURS
Refills: 0 | Status: DISCONTINUED | OUTPATIENT
Start: 2021-04-20 | End: 2021-04-20

## 2021-04-20 RX ORDER — SODIUM CHLORIDE 9 MG/ML
1000 INJECTION, SOLUTION INTRAVENOUS
Refills: 0 | Status: DISCONTINUED | OUTPATIENT
Start: 2021-04-20 | End: 2021-04-20

## 2021-04-20 NOTE — ASU PATIENT PROFILE, ADULT - PSH
Disorder of lymphatics  lymph node dissection left axilla 2010. NO IVs, BPs or venipuncture left arm  H/O knee surgery  20 years ago, right  H/O lymph node excision  lymph node transplant 2018 right groin to left axillae  H/O shoulder surgery  left shoulder muscle release, 2018  H/O skin graft  fat graft  chest area left 2x   2015 and 2016  for pain control as per patient  History of excision of lesion  left side of chest  melanoma 2010

## 2021-04-20 NOTE — ASU DISCHARGE PLAN (ADULT/PEDIATRIC) - CARE PROVIDER_API CALL
Deni Pena)  Plastic Surgery  [address] [place] [place]  [place] [address]  Phone: [phone]  Fax: [phone]  Follow Up Time: 1 week

## 2021-04-20 NOTE — ASU PATIENT PROFILE, ADULT - PMH
Colon polyps  precancerous, last colonoscopy January 2021  COVID-19  December 2020, mild symptoms and no hospitalization  COVID-19 vaccine series completed  moderna, second dose 2/9/21  History of COPD  secondary to second hand smoke exposure  Left breast mass  benign 2020  Lymphedema of arm  left 2010 post lymph node excision left axilla  Melanoma  left side of chest 2010 /excision of lesion and lymph node excision with immunotherapy  2010  Pain of left breast

## 2021-04-20 NOTE — ASU PATIENT PROFILE, ADULT - ANESTHESIA, PREVIOUS REACTION, PROFILE
occasional bronchospasm post-op/nausea/vomiting occasional bronchospasm post-op/nausea/vomiting/respiratory complications

## 2021-04-23 LAB — SURGICAL PATHOLOGY STUDY: SIGNIFICANT CHANGE UP

## 2021-05-05 ENCOUNTER — TRANSCRIPTION ENCOUNTER (OUTPATIENT)
Age: 66
End: 2021-05-05

## 2021-08-09 ENCOUNTER — APPOINTMENT (OUTPATIENT)
Dept: SURGICAL ONCOLOGY | Facility: CLINIC | Age: 66
End: 2021-08-09
Payer: COMMERCIAL

## 2021-08-09 VITALS
BODY MASS INDEX: 24.34 KG/M2 | SYSTOLIC BLOOD PRESSURE: 207 MMHG | DIASTOLIC BLOOD PRESSURE: 120 MMHG | HEIGHT: 70 IN | HEART RATE: 112 BPM | OXYGEN SATURATION: 98 % | WEIGHT: 170 LBS | RESPIRATION RATE: 18 BRPM

## 2021-08-09 PROBLEM — Z92.29 PERSONAL HISTORY OF OTHER DRUG THERAPY: Chronic | Status: ACTIVE | Noted: 2021-04-08

## 2021-08-09 PROBLEM — N64.4 MASTODYNIA: Chronic | Status: ACTIVE | Noted: 2021-04-08

## 2021-08-09 PROBLEM — K63.5 POLYP OF COLON: Chronic | Status: ACTIVE | Noted: 2021-04-08

## 2021-08-09 PROBLEM — N63.20 UNSPECIFIED LUMP IN THE LEFT BREAST, UNSPECIFIED QUADRANT: Chronic | Status: ACTIVE | Noted: 2021-04-08

## 2021-08-09 PROBLEM — Z87.09 PERSONAL HISTORY OF OTHER DISEASES OF THE RESPIRATORY SYSTEM: Chronic | Status: ACTIVE | Noted: 2019-12-03

## 2021-08-09 PROBLEM — U07.1 COVID-19: Chronic | Status: ACTIVE | Noted: 2021-04-08

## 2021-08-09 PROCEDURE — 99215 OFFICE O/P EST HI 40 MIN: CPT

## 2021-08-09 NOTE — REVIEW OF SYSTEMS
[Negative] : Endocrine [FreeTextEntry5] : Lymphedema [FreeTextEntry6] : Chronic pulmonary disease [de-identified] : Melanoma [FreeTextEntry1] : Increased risk of breast cancer

## 2021-08-09 NOTE — REASON FOR VISIT
[Follow-Up Visit] : a follow-up visit for [Other: _____] : [unfilled] [FreeTextEntry2] : Stage III melanoma of the left chest, and benign breast disease.

## 2021-08-09 NOTE — PHYSICAL EXAM
[Normal] : supple, no neck mass and thyroid not enlarged [Normal Neck Lymph Nodes] : normal neck lymph nodes  [Normal Supraclavicular Lymph Nodes] : normal supraclavicular lymph nodes [Normal Axillary Lymph Nodes] : normal axillary lymph nodes [Normal] : full range of motion and no deformities appreciated [de-identified] : Groins not examined [de-identified] : below

## 2021-08-09 NOTE — ASSESSMENT
[FreeTextEntry1] : \par \par January 2021:\par CT chest at 450:\par No suspicious findings, or interval change.\par Prescription entered for January 2022\par \par Annual mammogram and breast ultrasound  October 2020, at 450: BI-RADS 2.\par February 2021: Diagnostic left mammogram and sonogram at 450: BI-RADS 2\par Prescription entered for October 2021\par \par October 2020: Followup breast MRI, (breast cancer risk score 20.7): BI-RADS 2.\par We will repeat periodically balancing her increased risk of breast cancer against the concern of cumulative gadolinium exposure, will repeat April 2022, prescription entered\par \par \par Clinically doing well.\par If asymptomatic with normal imaging, we should see her in another year, sooner if needed\par \par

## 2021-08-09 NOTE — HISTORY OF PRESENT ILLNESS
[de-identified] : 66-year-old lady.\par \par At her 2021 visit she reported:\par 2-week history of unexplained left breast (UOQ) bruising, pain, and tenderness.\par \par No inciting event.\par No provocative or palliative factors.\par \par My PE:\par ~2.5 x 3 cm area of ecchymosis with tenderness in the upper outer quadrant of her left breast.\par No palpable abnormality.\par \par 2021:\par Diagnostic left mammogram and sonogram at 450: BI-RADS 2.\par \par 2020 breast MRI: BI-RADS 2.\par Breast cancer risk score = 20.7.\par \par She returns for scheduled follow-up today.\par \par \par CC: None.\par 2020 when she had left axillary neurolysis surgery by Dr. Deni LAI, with relief of her discomfort\par \par \par + Prior personal history:\par 2019 had wire localized excision of a left axillary imaging abnormality, which on preoperative needle biopsy was benign but discordant.\par \par Final pathology:\par Tissue, tail of left breast.\par Breast and fibroadipose tissue with prior procedures changes, fibrocystic change, usual ductal hyperplasia, sclerosing adenosis, apocrine metaplasia, PASH, and benign epithelial calcifications.\par Intramammary reactive lymph node with prior procedure site changes including cystic degeneration. \par Accompanying skin was normal.\par \par Oncoplastic closure: Dr. Riky DISLA\par + Chronic seroma.\par Surgical revision: 2020, with him, at [facility].\par \par 20.\par She presented to [facility] today for re-aspiration, and possible sclerotherapy for the left axillary seroma.\par However, on imaging, there was minimal fluid, and no suspicious findings.\par Therefore, no intervention performed.\par Discussed with radiology (Dr. Gerson Gaines)\par \par \par There was an ipsilateral, enlarging left breast nodule, which was benign and concordant on needle bx; one-year followup left breast ultrasound recommended.\par \par There was also unexplained skin thickening which prompted the skin biopsy which accompanied the excisional biopsy described above - benign.\par \par There were no additional suspicious findings on her preoperative, 2019, breast MRI.\par \par \par +FH:\par A sister had breast cancer in her 50s\par (Dec 2019 another sister dx with breast cancer @64)\par \par Her mother had Ovarian cancer.\par \par Her own genetic testing demonstrates NO deleterious mutation.\par \par \par Other family history of malignancy:\par Father: Melanoma.\par \par \par No other previous breast biopsies.\par \par \par Menarche at 14\par  2, para 2, first at 30.\par Natural menopause at 52.\par No exogenous hormones\par \par Her breast cancer risk score (calculated at 19): 20.7\par \par \par 10/22/20:\par 2 week history of "bone pain" in left thigh.\par No preceding injury or strain.\par No provocative or palliative factors.\par Pain does not radiate.\par Left femur x-rays ordered.\par \par 11-3-20.\par We spoke.\par Imaging 2020:\par Left femur x-ray, no suspicious findings.\par She still had pain in that extremity.\par Bone scan ordered.\par \par 20.\par We spoke.\par  2020 bone scan: No evidence of metastatic disease.\par \par \par History of stage III melanoma of the LEFT CHEST.\par \par 2018: cellulitic changes in the left upper extremity.\par Not preceded by any kind of injury.\par No constitutional signs or symptoms.\par Previous similar episodes were treated with Keflex, but now she is allergic.\par Treated with doxycycline.\par \par \par 2018 she reported some headaches and double vision.\par She had no other specific or constitutional neurologic signs or symptoms.\par 2018 brain MRI performed at our location demonstrated no abnormalities or interval changes.\par \par 2010: 3.0 mm melanoma LEFT CHEST  was resected with negative margins. \par Reconstruction was by Dr. Shaw KEY\par Single +SLN => a completion dissection which showed NO additional disease in 24 lymph nodes identified by the pathologist.\par \par +Adjuvant interferon under the supervision of Dr. Bry MOORE.\par \par Had  left chest wall scar revision and fat-grafting by Dr. Mino TAYLOR\par \par She goes to the [facility] lymphed[facility] for treatment, currently with a gauntlet and glove, for her left upper extremity.\par She had lymph node transfer surgery, 2018 by Dr. Jacob CONTRERAS- Unfortunately, no benefit.\par Consideration is being given to division of some of the latissimus dorsi muscle to allow for increased mobility of her upper extremity.\par 2021:\par Left axillary neurolysis by Dr. Deni LAI, with relief of symptoms\par \par +FH\par Her father had melanoma.\par She has tested negative on the Melaris assay.\par \par Her dermatologist is Dr. Chloe SY.\par 2021 visit was unremarkable\par 2020 biopsy of the inner right arm was benign.\par She used to see Dr. Mike Mccall,.\par \par \par Her internist is Dr. Frank AMICO.\par \par Pulmonary: Dr. Karuna KOENIG\par \par No pacemaker or defibrillator.\par No anticoagulants.\par \par Current medications:;.-  ProAir inhaler, recently diagnosed with COPD felt to be due to secondary smoke from her mother.\par \par \par Her gynecologist is Dr. Lucia JACKSON\par 2021 visit is scheduled.\par \par Breast imaging is recorded below\par \par \par Her last eye examination was  2019 with Dr. Cerna, she has retired.\par She has an appointment scheduled for 2021 with Dr. Willi MELTON.\par \par \par Colonoscopy: Dr. Earle PATEL.\par 2021 okay x3 years\par \par \par

## 2021-09-18 ENCOUNTER — TRANSCRIPTION ENCOUNTER (OUTPATIENT)
Age: 66
End: 2021-09-18

## 2021-10-06 PROBLEM — J44.9 OBSTRUCTIVE LUNG DISEASE: Status: ACTIVE | Noted: 2019-03-28

## 2021-10-08 ENCOUNTER — RESULT REVIEW (OUTPATIENT)
Age: 66
End: 2021-10-08

## 2021-10-08 ENCOUNTER — OUTPATIENT (OUTPATIENT)
Dept: OUTPATIENT SERVICES | Facility: HOSPITAL | Age: 66
LOS: 1 days | End: 2021-10-08
Payer: COMMERCIAL

## 2021-10-08 ENCOUNTER — APPOINTMENT (OUTPATIENT)
Dept: ULTRASOUND IMAGING | Facility: IMAGING CENTER | Age: 66
End: 2021-10-08
Payer: COMMERCIAL

## 2021-10-08 ENCOUNTER — APPOINTMENT (OUTPATIENT)
Dept: MAMMOGRAPHY | Facility: IMAGING CENTER | Age: 66
End: 2021-10-08
Payer: COMMERCIAL

## 2021-10-08 DIAGNOSIS — Z98.890 OTHER SPECIFIED POSTPROCEDURAL STATES: Chronic | ICD-10-CM

## 2021-10-08 DIAGNOSIS — Z00.8 ENCOUNTER FOR OTHER GENERAL EXAMINATION: ICD-10-CM

## 2021-10-08 DIAGNOSIS — I89.9 NONINFECTIVE DISORDER OF LYMPHATIC VESSELS AND LYMPH NODES, UNSPECIFIED: Chronic | ICD-10-CM

## 2021-10-08 DIAGNOSIS — Z87.2 PERSONAL HISTORY OF DISEASES OF THE SKIN AND SUBCUTANEOUS TISSUE: Chronic | ICD-10-CM

## 2021-10-08 PROCEDURE — G0279: CPT | Mod: 26

## 2021-10-08 PROCEDURE — 76641 ULTRASOUND BREAST COMPLETE: CPT | Mod: 26,50

## 2021-10-08 PROCEDURE — 76641 ULTRASOUND BREAST COMPLETE: CPT

## 2021-10-08 PROCEDURE — 77066 DX MAMMO INCL CAD BI: CPT | Mod: 26

## 2021-10-08 PROCEDURE — 77066 DX MAMMO INCL CAD BI: CPT

## 2021-10-08 PROCEDURE — G0279: CPT

## 2021-11-12 ENCOUNTER — OUTPATIENT (OUTPATIENT)
Dept: OUTPATIENT SERVICES | Facility: HOSPITAL | Age: 66
LOS: 1 days | End: 2021-11-12
Payer: COMMERCIAL

## 2021-11-12 VITALS
TEMPERATURE: 98 F | RESPIRATION RATE: 16 BRPM | HEIGHT: 69 IN | SYSTOLIC BLOOD PRESSURE: 140 MMHG | HEART RATE: 69 BPM | WEIGHT: 199.96 LBS | DIASTOLIC BLOOD PRESSURE: 74 MMHG | OXYGEN SATURATION: 98 %

## 2021-11-12 DIAGNOSIS — Z98.890 OTHER SPECIFIED POSTPROCEDURAL STATES: Chronic | ICD-10-CM

## 2021-11-12 DIAGNOSIS — I89.9 NONINFECTIVE DISORDER OF LYMPHATIC VESSELS AND LYMPH NODES, UNSPECIFIED: Chronic | ICD-10-CM

## 2021-11-12 DIAGNOSIS — Z87.2 PERSONAL HISTORY OF DISEASES OF THE SKIN AND SUBCUTANEOUS TISSUE: Chronic | ICD-10-CM

## 2021-11-12 DIAGNOSIS — Z85.3 PERSONAL HISTORY OF MALIGNANT NEOPLASM OF BREAST: ICD-10-CM

## 2021-11-12 DIAGNOSIS — I89.0 LYMPHEDEMA, NOT ELSEWHERE CLASSIFIED: ICD-10-CM

## 2021-11-12 LAB
ANION GAP SERPL CALC-SCNC: 8 MMOL/L — SIGNIFICANT CHANGE UP (ref 5–17)
APPEARANCE UR: CLEAR — SIGNIFICANT CHANGE UP
BASOPHILS # BLD AUTO: 0.05 K/UL — SIGNIFICANT CHANGE UP (ref 0–0.2)
BASOPHILS NFR BLD AUTO: 0.8 % — SIGNIFICANT CHANGE UP (ref 0–2)
BILIRUB UR-MCNC: NEGATIVE — SIGNIFICANT CHANGE UP
BUN SERPL-MCNC: 15 MG/DL — SIGNIFICANT CHANGE UP (ref 7–23)
CALCIUM SERPL-MCNC: 9.5 MG/DL — SIGNIFICANT CHANGE UP (ref 8.5–10.1)
CHLORIDE SERPL-SCNC: 103 MMOL/L — SIGNIFICANT CHANGE UP (ref 96–108)
CO2 SERPL-SCNC: 25 MMOL/L — SIGNIFICANT CHANGE UP (ref 22–31)
COLOR SPEC: YELLOW — SIGNIFICANT CHANGE UP
CREAT SERPL-MCNC: 0.8 MG/DL — SIGNIFICANT CHANGE UP (ref 0.5–1.3)
DIFF PNL FLD: NEGATIVE — SIGNIFICANT CHANGE UP
EOSINOPHIL # BLD AUTO: 0.08 K/UL — SIGNIFICANT CHANGE UP (ref 0–0.5)
EOSINOPHIL NFR BLD AUTO: 1.3 % — SIGNIFICANT CHANGE UP (ref 0–6)
GLUCOSE SERPL-MCNC: 115 MG/DL — HIGH (ref 70–99)
GLUCOSE UR QL: NEGATIVE MG/DL — SIGNIFICANT CHANGE UP
HCT VFR BLD CALC: 42.9 % — SIGNIFICANT CHANGE UP (ref 34.5–45)
HGB BLD-MCNC: 14.3 G/DL — SIGNIFICANT CHANGE UP (ref 11.5–15.5)
IMM GRANULOCYTES NFR BLD AUTO: 0.5 % — SIGNIFICANT CHANGE UP (ref 0–1.5)
KETONES UR-MCNC: NEGATIVE — SIGNIFICANT CHANGE UP
LEUKOCYTE ESTERASE UR-ACNC: ABNORMAL
LYMPHOCYTES # BLD AUTO: 1.4 K/UL — SIGNIFICANT CHANGE UP (ref 1–3.3)
LYMPHOCYTES # BLD AUTO: 22.3 % — SIGNIFICANT CHANGE UP (ref 13–44)
MCHC RBC-ENTMCNC: 32.6 PG — SIGNIFICANT CHANGE UP (ref 27–34)
MCHC RBC-ENTMCNC: 33.3 GM/DL — SIGNIFICANT CHANGE UP (ref 32–36)
MCV RBC AUTO: 97.9 FL — SIGNIFICANT CHANGE UP (ref 80–100)
MONOCYTES # BLD AUTO: 0.46 K/UL — SIGNIFICANT CHANGE UP (ref 0–0.9)
MONOCYTES NFR BLD AUTO: 7.3 % — SIGNIFICANT CHANGE UP (ref 2–14)
NEUTROPHILS # BLD AUTO: 4.27 K/UL — SIGNIFICANT CHANGE UP (ref 1.8–7.4)
NEUTROPHILS NFR BLD AUTO: 67.8 % — SIGNIFICANT CHANGE UP (ref 43–77)
NITRITE UR-MCNC: NEGATIVE — SIGNIFICANT CHANGE UP
PH UR: 5 — SIGNIFICANT CHANGE UP (ref 5–8)
PLATELET # BLD AUTO: 299 K/UL — SIGNIFICANT CHANGE UP (ref 150–400)
POTASSIUM SERPL-MCNC: 4.3 MMOL/L — SIGNIFICANT CHANGE UP (ref 3.5–5.3)
POTASSIUM SERPL-SCNC: 4.3 MMOL/L — SIGNIFICANT CHANGE UP (ref 3.5–5.3)
PROT UR-MCNC: NEGATIVE MG/DL — SIGNIFICANT CHANGE UP
RBC # BLD: 4.38 M/UL — SIGNIFICANT CHANGE UP (ref 3.8–5.2)
RBC # FLD: 11.8 % — SIGNIFICANT CHANGE UP (ref 10.3–14.5)
SODIUM SERPL-SCNC: 136 MMOL/L — SIGNIFICANT CHANGE UP (ref 135–145)
SP GR SPEC: 1.02 — SIGNIFICANT CHANGE UP (ref 1.01–1.02)
UROBILINOGEN FLD QL: NEGATIVE MG/DL — SIGNIFICANT CHANGE UP
WBC # BLD: 6.29 K/UL — SIGNIFICANT CHANGE UP (ref 3.8–10.5)
WBC # FLD AUTO: 6.29 K/UL — SIGNIFICANT CHANGE UP (ref 3.8–10.5)

## 2021-11-12 PROCEDURE — 81001 URINALYSIS AUTO W/SCOPE: CPT

## 2021-11-12 PROCEDURE — 86905 BLOOD TYPING RBC ANTIGENS: CPT

## 2021-11-12 PROCEDURE — 93005 ELECTROCARDIOGRAM TRACING: CPT

## 2021-11-12 PROCEDURE — 86920 COMPATIBILITY TEST SPIN: CPT

## 2021-11-12 PROCEDURE — 36415 COLL VENOUS BLD VENIPUNCTURE: CPT

## 2021-11-12 PROCEDURE — 86901 BLOOD TYPING SEROLOGIC RH(D): CPT

## 2021-11-12 PROCEDURE — 93010 ELECTROCARDIOGRAM REPORT: CPT

## 2021-11-12 PROCEDURE — 86902 BLOOD TYPE ANTIGEN DONOR EA: CPT

## 2021-11-12 PROCEDURE — 86921 COMPATIBILITY TEST INCUBATE: CPT

## 2021-11-12 PROCEDURE — 80048 BASIC METABOLIC PNL TOTAL CA: CPT

## 2021-11-12 PROCEDURE — 86850 RBC ANTIBODY SCREEN: CPT

## 2021-11-12 PROCEDURE — 86900 BLOOD TYPING SEROLOGIC ABO: CPT

## 2021-11-12 PROCEDURE — 86880 COOMBS TEST DIRECT: CPT

## 2021-11-12 PROCEDURE — G0463: CPT | Mod: 25

## 2021-11-12 PROCEDURE — 86922 COMPATIBILITY TEST ANTIGLOB: CPT

## 2021-11-12 PROCEDURE — 86870 RBC ANTIBODY IDENTIFICATION: CPT

## 2021-11-12 PROCEDURE — 85025 COMPLETE CBC W/AUTO DIFF WBC: CPT

## 2021-11-12 PROCEDURE — 86077 PHYS BLOOD BANK SERV XMATCH: CPT

## 2021-11-12 NOTE — H&P PST ADULT - HISTORY OF PRESENT ILLNESS
65 years old  female with a history of melanoma to left chest wall and personal history of left breast cancer. She had an excision with sentinal node biopsy  in 2010. July 2020 left breast lumpectomy and reconstruction. Complained of left breast pain. April 2021 she had a clip removed from left breast and reattachment of nerves. Reports pain free to left breast. lymphedema to left upper extremity. Lymph node transplant done 2018. edema persist.  Planned left lymph node procedure, left breast adjacent tissue transfer and right breast reduction.

## 2021-11-12 NOTE — H&P PST ADULT - NSICDXPASTMEDICALHX_GEN_ALL_CORE_FT
PAST MEDICAL HISTORY:  Colon polyps precancerous, last colonoscopy January 2021    COVID-19 December 2020, mild symptoms and no hospitalization    COVID-19 vaccine series completed moderna, second dose 2/9/21    History of COPD secondary to second hand smoke exposure    Left breast mass benign 2020    Lymphedema of arm left 2010 post lymph node excision left axilla    Melanoma left side of chest 2010 /excision of lesion and lymph node excision with immunotherapy  2010    Pain of left breast

## 2021-11-12 NOTE — H&P PST ADULT - ASSESSMENT
66 years old female present to PST prior to left lymph procedure,  left breast adjacent tissue transfer, right breast reduction with Dr. Coburn.    Plan   1. NPO after midnight  2. Covid swab scheduled for 11/21/21  3. Use E-Z sponge as directed  4. Drink a quart of extra  fluids the day before your surgery.  5. Medical optimization for surgery with Dr. Amico  6. CBC, BMP,  Urinalysis, Type and Screen sent to lab  7. EKG done

## 2021-11-13 DIAGNOSIS — Z85.3 PERSONAL HISTORY OF MALIGNANT NEOPLASM OF BREAST: ICD-10-CM

## 2021-11-13 DIAGNOSIS — I89.0 LYMPHEDEMA, NOT ELSEWHERE CLASSIFIED: ICD-10-CM

## 2021-11-21 ENCOUNTER — APPOINTMENT (OUTPATIENT)
Dept: DISASTER EMERGENCY | Facility: CLINIC | Age: 66
End: 2021-11-21

## 2021-11-22 LAB — SARS-COV-2 N GENE NPH QL NAA+PROBE: NOT DETECTED

## 2021-11-23 RX ORDER — ONDANSETRON 8 MG/1
4 TABLET, FILM COATED ORAL ONCE
Refills: 0 | Status: DISCONTINUED | OUTPATIENT
Start: 2021-11-24 | End: 2021-11-24

## 2021-11-23 RX ORDER — FENTANYL CITRATE 50 UG/ML
50 INJECTION INTRAVENOUS
Refills: 0 | Status: DISCONTINUED | OUTPATIENT
Start: 2021-11-24 | End: 2021-11-24

## 2021-11-23 RX ORDER — OXYCODONE HYDROCHLORIDE 5 MG/1
10 TABLET ORAL ONCE
Refills: 0 | Status: DISCONTINUED | OUTPATIENT
Start: 2021-11-24 | End: 2021-11-24

## 2021-11-23 RX ORDER — SODIUM CHLORIDE 9 MG/ML
1000 INJECTION, SOLUTION INTRAVENOUS
Refills: 0 | Status: DISCONTINUED | OUTPATIENT
Start: 2021-11-24 | End: 2021-11-24

## 2021-11-24 ENCOUNTER — RESULT REVIEW (OUTPATIENT)
Age: 66
End: 2021-11-24

## 2021-11-24 ENCOUNTER — OUTPATIENT (OUTPATIENT)
Dept: INPATIENT UNIT | Facility: HOSPITAL | Age: 66
LOS: 1 days | Discharge: HOME | End: 2021-11-24
Payer: COMMERCIAL

## 2021-11-24 VITALS
TEMPERATURE: 98 F | SYSTOLIC BLOOD PRESSURE: 126 MMHG | RESPIRATION RATE: 16 BRPM | DIASTOLIC BLOOD PRESSURE: 64 MMHG | OXYGEN SATURATION: 98 % | HEART RATE: 74 BPM

## 2021-11-24 VITALS
WEIGHT: 199.96 LBS | DIASTOLIC BLOOD PRESSURE: 71 MMHG | SYSTOLIC BLOOD PRESSURE: 133 MMHG | OXYGEN SATURATION: 100 % | RESPIRATION RATE: 16 BRPM | TEMPERATURE: 98 F | HEIGHT: 69 IN | HEART RATE: 71 BPM

## 2021-11-24 DIAGNOSIS — Z80.3 FAMILY HISTORY OF MALIGNANT NEOPLASM OF BREAST: ICD-10-CM

## 2021-11-24 DIAGNOSIS — Z85.3 PERSONAL HISTORY OF MALIGNANT NEOPLASM OF BREAST: ICD-10-CM

## 2021-11-24 DIAGNOSIS — L90.5 SCAR CONDITIONS AND FIBROSIS OF SKIN: ICD-10-CM

## 2021-11-24 DIAGNOSIS — N65.0 DEFORMITY OF RECONSTRUCTED BREAST: ICD-10-CM

## 2021-11-24 DIAGNOSIS — Z82.49 FAMILY HISTORY OF ISCHEMIC HEART DISEASE AND OTHER DISEASES OF THE CIRCULATORY SYSTEM: ICD-10-CM

## 2021-11-24 DIAGNOSIS — Z88.3 ALLERGY STATUS TO OTHER ANTI-INFECTIVE AGENTS: ICD-10-CM

## 2021-11-24 DIAGNOSIS — Z87.2 PERSONAL HISTORY OF DISEASES OF THE SKIN AND SUBCUTANEOUS TISSUE: Chronic | ICD-10-CM

## 2021-11-24 DIAGNOSIS — I89.0 LYMPHEDEMA, NOT ELSEWHERE CLASSIFIED: ICD-10-CM

## 2021-11-24 DIAGNOSIS — N60.41 MAMMARY DUCT ECTASIA OF RIGHT BREAST: ICD-10-CM

## 2021-11-24 DIAGNOSIS — Z98.890 OTHER SPECIFIED POSTPROCEDURAL STATES: Chronic | ICD-10-CM

## 2021-11-24 DIAGNOSIS — Z85.820 PERSONAL HISTORY OF MALIGNANT MELANOMA OF SKIN: ICD-10-CM

## 2021-11-24 DIAGNOSIS — J44.9 CHRONIC OBSTRUCTIVE PULMONARY DISEASE, UNSPECIFIED: ICD-10-CM

## 2021-11-24 DIAGNOSIS — I89.9 NONINFECTIVE DISORDER OF LYMPHATIC VESSELS AND LYMPH NODES, UNSPECIFIED: Chronic | ICD-10-CM

## 2021-11-24 DIAGNOSIS — Z83.3 FAMILY HISTORY OF DIABETES MELLITUS: ICD-10-CM

## 2021-11-24 DIAGNOSIS — N63.20 UNSPECIFIED LUMP IN THE LEFT BREAST, UNSPECIFIED QUADRANT: ICD-10-CM

## 2021-11-24 DIAGNOSIS — N63.10 UNSPECIFIED LUMP IN THE RIGHT BREAST, UNSPECIFIED QUADRANT: ICD-10-CM

## 2021-11-24 DIAGNOSIS — N64.1 FAT NECROSIS OF BREAST: ICD-10-CM

## 2021-11-24 DIAGNOSIS — Z79.51 LONG TERM (CURRENT) USE OF INHALED STEROIDS: ICD-10-CM

## 2021-11-24 PROCEDURE — 88305 TISSUE EXAM BY PATHOLOGIST: CPT | Mod: 26

## 2021-11-24 PROCEDURE — 88305 TISSUE EXAM BY PATHOLOGIST: CPT

## 2021-11-24 RX ORDER — ACETAMINOPHEN 500 MG
2 TABLET ORAL
Qty: 0 | Refills: 0 | DISCHARGE

## 2021-11-24 RX ORDER — ALBUTEROL 90 UG/1
2 AEROSOL, METERED ORAL
Qty: 0 | Refills: 0 | DISCHARGE

## 2021-11-24 RX ORDER — OXYCODONE AND ACETAMINOPHEN 5; 325 MG/1; MG/1
1 TABLET ORAL EVERY 4 HOURS
Refills: 0 | Status: DISCONTINUED | OUTPATIENT
Start: 2021-11-24 | End: 2021-11-24

## 2021-11-24 RX ADMIN — FENTANYL CITRATE 50 MICROGRAM(S): 50 INJECTION INTRAVENOUS at 10:45

## 2021-11-24 RX ADMIN — FENTANYL CITRATE 50 MICROGRAM(S): 50 INJECTION INTRAVENOUS at 11:00

## 2021-11-24 RX ADMIN — FENTANYL CITRATE 50 MICROGRAM(S): 50 INJECTION INTRAVENOUS at 11:30

## 2021-11-24 RX ADMIN — FENTANYL CITRATE 50 MICROGRAM(S): 50 INJECTION INTRAVENOUS at 11:15

## 2021-11-24 NOTE — ASU PATIENT PROFILE, ADULT - NSICDXPASTSURGICALHX_GEN_ALL_CORE_FT
PAST SURGICAL HISTORY:  Disorder of lymphatics lymph node dissection left axilla 2010. NO IVs, BPs or venipuncture left arm    H/O knee surgery 20 years ago, right    H/O lymph node excision lymph node transplant 2018 right groin to left axillae    H/O shoulder surgery left shoulder muscle release, 2018    H/O skin graft fat graft  chest area left 2x   2015 and 2016  for pain control as per patient    History of excision of lesion left side of chest  melanoma 2010    History of other surgery 4/2021. removal of clip left breast and reattachement of nerves.

## 2021-11-24 NOTE — BRIEF OPERATIVE NOTE - NSICDXBRIEFPROCEDURE_GEN_ALL_CORE_FT
PROCEDURES:  Lymphovenous bypass of upper extremity 24-Nov-2021 11:42:57 Left Upper extremity Teodora Corona  Mastopexy 24-Nov-2021 11:43:20 Right breast Teodora Corona  Release of contracture using Z-plasty 24-Nov-2021 11:45:09 of Left lateral chest Teodora Corona

## 2021-11-24 NOTE — ASU DISCHARGE PLAN (ADULT/PEDIATRIC) - CARE PROVIDER_API CALL
Deni Pena)  Plastic Surgery  [address], [address]  [place] [address]  Phone: [phone]  Fax: [phone]  Follow Up Time:

## 2021-11-24 NOTE — BRIEF OPERATIVE NOTE - NSICDXBRIEFPROCEDURE_GEN_ALL_CORE_FT
PROCEDURES:  Mastopexy 24-Nov-2021 11:18:18 Right sided with Left arm Lympho-bypass, Z-plasty of Left lateral chest wall. Teodora Corona

## 2021-11-24 NOTE — ASU DISCHARGE PLAN (ADULT/PEDIATRIC) - ASU DC SPECIAL INSTRUCTIONSFT
may re-apply surgi-bra and Left upper extremity compression after shower.  Keep sites clean and dry. Follow up with Dr Pena in 1 week

## 2021-11-24 NOTE — ASU DISCHARGE PLAN (ADULT/PEDIATRIC) - CARE PROVIDER_API CALL
Deni Pena)  Plastic Surgery  [address], [address]  [place] [address]  Phone: [phone]  Fax: [phone]  Follow Up Time:

## 2021-11-24 NOTE — ASU DISCHARGE PLAN (ADULT/PEDIATRIC) - PROCEDURE
Right Breast Mastopexy, Z-plasty reconstruction of Left lateral chest, and Lympho-venous bypass of Lft Upper extremity

## 2021-11-24 NOTE — BRIEF OPERATIVE NOTE - NSICDXBRIEFPREOP_GEN_ALL_CORE_FT
PRE-OP DIAGNOSIS:  History of left breast cancer 24-Nov-2021 11:45:41 with Lymph node disssection Teodora Corona

## 2021-11-24 NOTE — ASU DISCHARGE PLAN (ADULT/PEDIATRIC) - CALL YOUR DOCTOR IF YOU HAVE ANY OF THE FOLLOWING:
Bleeding that does not stop/Swelling that gets worse/Pain not relieved by Medications/Wound/Surgical Site with redness, or foul smelling discharge or pus/Numbness, tingling, color or temperature change to extremity/Nausea and vomiting that does not stop/Inability to tolerate liquids or foods

## 2022-01-01 NOTE — H&P PST ADULT - NSICDXPASTSURGICALHX_GEN_ALL_CORE_FT
COMMENTS:   Day 6, 31 3/7 weeks corrected gestational age. Stable in isolette.     PLAN:  -Provide developmental supportive care  - Follow growth velocity  -Initial CUS ordered for tomorrow   PAST SURGICAL HISTORY:  Disorder of lymphatics lymph node dissection left axilla 2010. NO IVs, BPs or venipuncture left arm    H/O knee surgery 20 years ago, right    H/O lymph node excision lymph node transplant 2018 right groin to left axillae    H/O shoulder surgery left shoulder muscle release, 2018    H/O skin graft fat graft  chest area left 2x   2015 and 2016  for pain control as per patient    History of excision of lesion left side of chest  melanoma 2010    History of other surgery 4/2021. removal of clip left breast and reattachement of nerves.

## 2022-01-28 ENCOUNTER — APPOINTMENT (OUTPATIENT)
Dept: CT IMAGING | Facility: IMAGING CENTER | Age: 67
End: 2022-01-28
Payer: COMMERCIAL

## 2022-01-28 ENCOUNTER — OUTPATIENT (OUTPATIENT)
Dept: OUTPATIENT SERVICES | Facility: HOSPITAL | Age: 67
LOS: 1 days | End: 2022-01-28
Payer: COMMERCIAL

## 2022-01-28 DIAGNOSIS — Z98.890 OTHER SPECIFIED POSTPROCEDURAL STATES: Chronic | ICD-10-CM

## 2022-01-28 DIAGNOSIS — Z87.2 PERSONAL HISTORY OF DISEASES OF THE SKIN AND SUBCUTANEOUS TISSUE: Chronic | ICD-10-CM

## 2022-01-28 DIAGNOSIS — C43.59 MALIGNANT MELANOMA OF OTHER PART OF TRUNK: ICD-10-CM

## 2022-01-28 DIAGNOSIS — I89.9 NONINFECTIVE DISORDER OF LYMPHATIC VESSELS AND LYMPH NODES, UNSPECIFIED: Chronic | ICD-10-CM

## 2022-01-28 PROCEDURE — 71250 CT THORAX DX C-: CPT

## 2022-01-28 PROCEDURE — 71250 CT THORAX DX C-: CPT | Mod: 26,MH

## 2022-03-09 NOTE — ED PROVIDER NOTE - CHIEF COMPLAINT
The patient is a 65y Female complaining of Rhofade Counseling: Rhofade is a topical medication which can decrease superficial blood flow where applied. Side effects are uncommon and include stinging, redness and allergic reactions.

## 2022-03-16 ENCOUNTER — TRANSCRIPTION ENCOUNTER (OUTPATIENT)
Age: 67
End: 2022-03-16

## 2022-04-20 ENCOUNTER — OUTPATIENT (OUTPATIENT)
Dept: OUTPATIENT SERVICES | Facility: HOSPITAL | Age: 67
LOS: 1 days | End: 2022-04-20
Payer: COMMERCIAL

## 2022-04-20 ENCOUNTER — APPOINTMENT (OUTPATIENT)
Dept: MRI IMAGING | Facility: IMAGING CENTER | Age: 67
End: 2022-04-20
Payer: COMMERCIAL

## 2022-04-20 ENCOUNTER — RESULT REVIEW (OUTPATIENT)
Age: 67
End: 2022-04-20

## 2022-04-20 DIAGNOSIS — Z87.2 PERSONAL HISTORY OF DISEASES OF THE SKIN AND SUBCUTANEOUS TISSUE: Chronic | ICD-10-CM

## 2022-04-20 DIAGNOSIS — I89.9 NONINFECTIVE DISORDER OF LYMPHATIC VESSELS AND LYMPH NODES, UNSPECIFIED: Chronic | ICD-10-CM

## 2022-04-20 DIAGNOSIS — Z98.890 OTHER SPECIFIED POSTPROCEDURAL STATES: Chronic | ICD-10-CM

## 2022-04-20 DIAGNOSIS — Z00.00 ENCOUNTER FOR GENERAL ADULT MEDICAL EXAMINATION WITHOUT ABNORMAL FINDINGS: ICD-10-CM

## 2022-04-20 PROCEDURE — C8908: CPT

## 2022-04-20 PROCEDURE — A9585: CPT

## 2022-04-20 PROCEDURE — 77049 MRI BREAST C-+ W/CAD BI: CPT | Mod: 26

## 2022-04-20 PROCEDURE — C8937: CPT

## 2022-05-13 NOTE — H&P PST ADULT - ANTERIOR CERVICAL R
----- Message from Steven Soto CMA sent at 5/10/2022  4:25 PM CDT -----  Regarding: FW: Handicap parking    ----- Message -----  From: Eryn Ball  Sent: 5/10/2022   3:51 PM CDT  To: Micheline Cowart Nurse Msg Pool  Subject: Handicap parking                                 Dr. Cowart,  could you fill out form for me to get handicap parking sticker.  My legs bother me when I  walk more than 50 ft.  I have to stop and rest when walking any amount of time.   When walking  a stretch my back also starts hurting .  When grocery shopping it helps to push the cart  makes walking a little easier.   My legs are so stiff and sometimes feel like i am walking on wooden posts.   I have been trying to get up every half hour and just walk around my office to loosen them up.   Thank you in advance  [place]     
Could you printout a copy of the permanent handicapped plaque for her to fill out.  
Paper work place on your office to sing.   
normal

## 2022-08-15 ENCOUNTER — APPOINTMENT (OUTPATIENT)
Dept: SURGICAL ONCOLOGY | Facility: CLINIC | Age: 67
End: 2022-08-15

## 2022-08-15 VITALS
TEMPERATURE: 97.6 F | SYSTOLIC BLOOD PRESSURE: 179 MMHG | WEIGHT: 170 LBS | HEIGHT: 70 IN | RESPIRATION RATE: 17 BRPM | HEART RATE: 104 BPM | BODY MASS INDEX: 24.34 KG/M2 | DIASTOLIC BLOOD PRESSURE: 104 MMHG | OXYGEN SATURATION: 98 %

## 2022-08-15 PROCEDURE — 99214 OFFICE O/P EST MOD 30 MIN: CPT

## 2022-08-15 NOTE — HISTORY OF PRESENT ILLNESS
[de-identified] : 67 year-old lady.\par \par At her 2021 visit she reported:\par 2-week history of unexplained left breast (UOQ) bruising, pain, and tenderness.\par \par No inciting event.\par No provocative or palliative factors.\par \par My PE:\par ~2.5 x 3 cm area of ecchymosis with tenderness in the upper outer quadrant of her left breast.\par No palpable abnormality.\par \par 2021:\par Diagnostic left mammogram and sonogram at 450: BI-RADS 2.\par \par 2020 breast MRI: BI-RADS 2.\par Breast cancer risk score = 20.7.\par \par \par She returns for scheduled follow-up today.\par \par \par CC: None.\par 2020 when she had left axillary neurolysis surgery by Dr. Deni LAI, with relief of her discomfort\par \par \par + Prior personal history:\par 2019 had wire localized excision of a left axillary imaging abnormality, which on preoperative needle biopsy was benign but discordant.\par \par Final pathology:\par Tissue, tail of left breast.\par Breast and fibroadipose tissue with prior procedures changes, fibrocystic change, usual ductal hyperplasia, sclerosing adenosis, apocrine metaplasia, PASH, and benign epithelial calcifications.\par Intramammary reactive lymph node with prior procedure site changes including cystic degeneration. \par Accompanying skin was normal.\par \par Oncoplastic closure: Dr. Riky DISLA\par + Chronic seroma.\par Surgical revision: 2020, with him, at [facility].\par \par 20.\par She presented to [facility] today for re-aspiration, and possible sclerotherapy for the left axillary seroma.\par However, on imaging, there was minimal fluid, and no suspicious findings.\par Therefore, no intervention performed.\par Discussed with radiology (Dr. Gerson Gaines)\par \par \par There was an ipsilateral, enlarging left breast nodule, which was benign and concordant on needle bx; one-year followup left breast ultrasound recommended.\par \par There was also unexplained skin thickening which prompted the skin biopsy which accompanied the excisional biopsy described above - benign.\par \par There were no additional suspicious findings on her preoperative, 2019, breast MRI.\par \par \par +FH:\par A sister had breast cancer in her 50s\par (Dec 2019 another sister dx with breast cancer @64)\par \par Her mother had Ovarian cancer.\par \par Her own genetic testing demonstrates NO deleterious mutation.\par \par \par Other family history of malignancy:\par Father: Melanoma.\par \par \par No other previous breast biopsies.\par \par \par Menarche at 14\par  2, para 2, first at 30.\par Natural menopause at 52.\par No exogenous hormones\par \par Her breast cancer risk score (calculated at 19): 20.7\par \par \par 10/22/20:\par 2 week history of "bone pain" in left thigh.\par No preceding injury or strain.\par No provocative or palliative factors.\par Pain did not radiate.\par \par Imaging 2020:\par Left femur x-ray, no suspicious findings.\par She still had pain in that extremity.\par \par 2020 bone scan: No evidence of metastatic disease.\par \par \par History of stage III melanoma of the LEFT CHEST.\par \par 2018: cellulitic changes in the left upper extremity.\par Not preceded by any kind of injury.\par No constitutional signs or symptoms.\par Previous similar episodes were treated with Keflex, but now she is allergic.\par Treated with doxycycline.\par \par \par 2018 she reported some headaches and double vision.\par She had no other specific or constitutional neurologic signs or symptoms.\par 2018 brain MRI performed at our location demonstrated no abnormalities or interval changes.\par \par 2010: 3.0 mm melanoma LEFT CHEST  was resected with negative margins. \par Reconstruction was by Dr. Shaw KEY\par Single +SLN => a completion dissection which showed NO additional disease in 24 lymph nodes identified by the pathologist.\par \par +Adjuvant interferon under the supervision of Dr. Bry MOORE.\par \par Had  left chest wall scar revision and fat-grafting by Dr. Mino TAYLOR\par \par She goes to the [facility] l[facility] for treatment, currently with a gauntlet and glove, for her left upper extremity.\par She had lymph node transfer surgery, 2018 by Dr. Jacob CONTRERAS- Unfortunately, no benefit.\par Consideration is being given to division of some of the latissimus dorsi muscle to allow for increased mobility of her upper extremity.\par 2021:\par Left axillary neurolysis by Dr. Deni LAI, with relief of symptoms\par \par +FH\par Her father had melanoma.\par She has tested negative on the Melaris assay.\par \par \par Her dermatologist is Dr. Chloe SY.\par 2022 visit is scheduled\par She used to see Dr. Mike Mccall,.\par \par \par Her internist is Dr. Frank AMICO.\par \par Pulmonary: Dr. Karuna KOENIG\par \par No pacemaker or defibrillator.\par No anticoagulants.\par \par Current medications:;.-  ProAir inhaler, recently diagnosed with COPD felt to be due to secondary smoke from her mother.\par \par \par Her gynecologist is Dr. Lucia JACKSON\par 2021 visit ok.\par \par Breast imaging is recorded below\par \par \par Eye examination:\par 2022 with Dr. Willi MELTON.\par \par \par Colonoscopy: Dr. Earle PATEL.\par 2021 okay x3 years

## 2022-08-15 NOTE — REVIEW OF SYSTEMS
[Negative] : Endocrine [FreeTextEntry6] : COPD [de-identified] : Melanoma [FreeTextEntry1] : Increased risk of breast cancer

## 2022-08-15 NOTE — ASSESSMENT
[FreeTextEntry1] : \par \par January 2022:\par CT chest at 450:\par No suspicious findings, or interval change.\par Prescription entered for January 2023\par \par Annual mammogram and breast ultrasound  October 201, at 450: BI-RADS 2.\par Prescription entered for October 2022\par \par April 2022: Followup breast MRI, (breast cancer risk score 20.7): BI-RADS 2.\par We will repeat periodically balancing her increased risk of breast cancer against the concern of cumulative gadolinium exposure...............\par \par \par Clinically doing well.\par If asymptomatic with normal imaging, we should see her in another year, sooner if needed\par \par

## 2022-08-15 NOTE — REASON FOR VISIT
[Follow-Up Visit] : a follow-up visit for [Other: _____] : [unfilled] [FreeTextEntry2] : Left chest stage III melanoma, and benign breast disease

## 2022-08-15 NOTE — PHYSICAL EXAM
[Normal] : supple, no neck mass and thyroid not enlarged [Normal Neck Lymph Nodes] : normal neck lymph nodes  [Normal Supraclavicular Lymph Nodes] : normal supraclavicular lymph nodes [Normal Axillary Lymph Nodes] : normal axillary lymph nodes [Normal] : full range of motion and no deformities appreciated [de-identified] : Groins not examined [de-identified] : below

## 2022-09-14 ENCOUNTER — RESULT REVIEW (OUTPATIENT)
Age: 67
End: 2022-09-14

## 2022-10-12 ENCOUNTER — RESULT REVIEW (OUTPATIENT)
Age: 67
End: 2022-10-12

## 2022-10-12 ENCOUNTER — OUTPATIENT (OUTPATIENT)
Dept: OUTPATIENT SERVICES | Facility: HOSPITAL | Age: 67
LOS: 1 days | End: 2022-10-12
Payer: COMMERCIAL

## 2022-10-12 ENCOUNTER — APPOINTMENT (OUTPATIENT)
Dept: ULTRASOUND IMAGING | Facility: IMAGING CENTER | Age: 67
End: 2022-10-12

## 2022-10-12 ENCOUNTER — APPOINTMENT (OUTPATIENT)
Dept: MAMMOGRAPHY | Facility: IMAGING CENTER | Age: 67
End: 2022-10-12

## 2022-10-12 DIAGNOSIS — C43.59 MALIGNANT MELANOMA OF OTHER PART OF TRUNK: ICD-10-CM

## 2022-10-12 DIAGNOSIS — Z87.2 PERSONAL HISTORY OF DISEASES OF THE SKIN AND SUBCUTANEOUS TISSUE: Chronic | ICD-10-CM

## 2022-10-12 DIAGNOSIS — Z98.890 OTHER SPECIFIED POSTPROCEDURAL STATES: Chronic | ICD-10-CM

## 2022-10-12 DIAGNOSIS — I89.9 NONINFECTIVE DISORDER OF LYMPHATIC VESSELS AND LYMPH NODES, UNSPECIFIED: Chronic | ICD-10-CM

## 2022-10-12 PROCEDURE — 77066 DX MAMMO INCL CAD BI: CPT

## 2022-10-12 PROCEDURE — G0279: CPT | Mod: 26

## 2022-10-12 PROCEDURE — 76641 ULTRASOUND BREAST COMPLETE: CPT | Mod: 26,50

## 2022-10-12 PROCEDURE — 76641 ULTRASOUND BREAST COMPLETE: CPT

## 2022-10-12 PROCEDURE — 77066 DX MAMMO INCL CAD BI: CPT | Mod: 26

## 2022-10-12 PROCEDURE — G0279: CPT

## 2023-01-30 ENCOUNTER — OUTPATIENT (OUTPATIENT)
Dept: OUTPATIENT SERVICES | Facility: HOSPITAL | Age: 68
LOS: 1 days | End: 2023-01-30
Payer: COMMERCIAL

## 2023-01-30 ENCOUNTER — APPOINTMENT (OUTPATIENT)
Dept: CT IMAGING | Facility: IMAGING CENTER | Age: 68
End: 2023-01-30
Payer: COMMERCIAL

## 2023-01-30 DIAGNOSIS — C43.59 MALIGNANT MELANOMA OF OTHER PART OF TRUNK: ICD-10-CM

## 2023-01-30 DIAGNOSIS — Z87.2 PERSONAL HISTORY OF DISEASES OF THE SKIN AND SUBCUTANEOUS TISSUE: Chronic | ICD-10-CM

## 2023-01-30 DIAGNOSIS — Z98.890 OTHER SPECIFIED POSTPROCEDURAL STATES: Chronic | ICD-10-CM

## 2023-01-30 DIAGNOSIS — I89.9 NONINFECTIVE DISORDER OF LYMPHATIC VESSELS AND LYMPH NODES, UNSPECIFIED: Chronic | ICD-10-CM

## 2023-01-30 PROCEDURE — 71250 CT THORAX DX C-: CPT | Mod: 26,MH

## 2023-01-30 PROCEDURE — 71250 CT THORAX DX C-: CPT

## 2023-08-14 ENCOUNTER — APPOINTMENT (OUTPATIENT)
Dept: SURGICAL ONCOLOGY | Facility: CLINIC | Age: 68
End: 2023-08-14
Payer: COMMERCIAL

## 2023-08-14 VITALS
HEIGHT: 70 IN | OXYGEN SATURATION: 98 % | WEIGHT: 170 LBS | HEART RATE: 115 BPM | SYSTOLIC BLOOD PRESSURE: 205 MMHG | BODY MASS INDEX: 24.34 KG/M2 | DIASTOLIC BLOOD PRESSURE: 114 MMHG | RESPIRATION RATE: 17 BRPM

## 2023-08-14 PROCEDURE — 99215 OFFICE O/P EST HI 40 MIN: CPT

## 2023-08-16 ENCOUNTER — OUTPATIENT (OUTPATIENT)
Dept: OUTPATIENT SERVICES | Facility: HOSPITAL | Age: 68
LOS: 1 days | End: 2023-08-16
Payer: COMMERCIAL

## 2023-08-16 ENCOUNTER — APPOINTMENT (OUTPATIENT)
Dept: ULTRASOUND IMAGING | Facility: IMAGING CENTER | Age: 68
End: 2023-08-16
Payer: COMMERCIAL

## 2023-08-16 DIAGNOSIS — Z98.890 OTHER SPECIFIED POSTPROCEDURAL STATES: Chronic | ICD-10-CM

## 2023-08-16 DIAGNOSIS — C43.59 MALIGNANT MELANOMA OF OTHER PART OF TRUNK: ICD-10-CM

## 2023-08-16 DIAGNOSIS — I89.9 NONINFECTIVE DISORDER OF LYMPHATIC VESSELS AND LYMPH NODES, UNSPECIFIED: Chronic | ICD-10-CM

## 2023-08-16 DIAGNOSIS — Z87.2 PERSONAL HISTORY OF DISEASES OF THE SKIN AND SUBCUTANEOUS TISSUE: Chronic | ICD-10-CM

## 2023-08-16 PROCEDURE — 76882 US LMTD JT/FCL EVL NVASC XTR: CPT | Mod: 26,LT

## 2023-08-16 PROCEDURE — 76882 US LMTD JT/FCL EVL NVASC XTR: CPT

## 2023-08-16 NOTE — HISTORY OF PRESENT ILLNESS
[de-identified] : 68 year-old lady.   She presents today (2023) with a 2-month history of a painful swelling in the left axillary region which restricts her movement. No preceding injury or strain. No other specific or constitutional signs or symptoms. No provocative or palliative factors.  At her 2021 visit she reported: 2-week history of unexplained left breast (UOQ) bruising, pain, and tenderness.  No inciting event. No provocative or palliative factors.  My PE: ~2.5 x 3 cm area of ecchymosis with tenderness in the upper outer quadrant of her left breast. No palpable abnormality.  2021: Diagnostic left mammogram and sonogram at 450: BI-RADS 2.  2020 breast MRI: BI-RADS 2. Breast cancer risk score = 20.7.   2020 when she had left axillary neurolysis surgery by Dr. Deni LAI, with relief of her discomfort   + Prior personal history: 2019 had wire localized excision of a left axillary imaging abnormality, which on preoperative needle biopsy was benign but discordant.  Final pathology: Tissue, tail of left breast. Breast and fibroadipose tissue with prior procedures changes, fibrocystic change, usual ductal hyperplasia, sclerosing adenosis, apocrine metaplasia, PASH, and benign epithelial calcifications. Intramammary reactive lymph node with prior procedure site changes including cystic degeneration.  Accompanying skin was normal.  Oncoplastic closure: Dr. Riky DISLA + Chronic seroma. Surgical revision: 2020, with him, at [facility].  20. She presented to [facility] today for re-aspiration, and possible sclerotherapy for the left axillary seroma. However, on imaging, there was minimal fluid, and no suspicious findings. Therefore, no intervention performed. Discussed with radiology (Dr. Gerson Gaines)   There was an ipsilateral, enlarging left breast nodule, which was benign and concordant on needle bx; one-year followup left breast ultrasound recommended.  There was also unexplained skin thickening which prompted the skin biopsy which accompanied the excisional biopsy described above - benign.  There were no additional suspicious findings on her preoperative, 2019, breast MRI.   +FH: A sister had breast cancer in her 50s (Dec 2019 another sister dx with breast cancer @64)  Her mother had Ovarian cancer.  Her own genetic testing demonstrates NO deleterious mutation.   Other family history of malignancy: Father: Melanoma.   No other previous breast biopsies.   Menarche at 14  2, para 2, first at 30. Natural menopause at 52. No exogenous hormones  Her breast cancer risk score (calculated at 19): 20.7   10/22/20: 2 week history of "bone pain" in left thigh. No preceding injury or strain. No provocative or palliative factors. Pain did not radiate.  Imaging 2020: Left femur x-ray, no suspicious findings. She still had pain in that extremity.  2020 bone scan: No evidence of metastatic disease.   History of stage III melanoma of the LEFT CHEST.  2018: cellulitic changes in the left upper extremity. Not preceded by any kind of injury. No constitutional signs or symptoms. Previous similar episodes were treated with Keflex, but now she is allergic. Treated with doxycycline.   2018 she reported some headaches and double vision. She had no other specific or constitutional neurologic signs or symptoms. 2018 brain MRI performed at our location demonstrated no abnormalities or interval changes.  2010: 3.0 mm melanoma LEFT CHEST  was resected with negative margins.  Reconstruction was by Dr. Shaw KEY Single +SLN => a completion dissection which showed NO additional disease in 24 lymph nodes identified by the pathologist.  +Adjuvant interferon under the supervision of Dr. Bry MOORE.  Had  left chest wall scar revision and fat-grafting by Dr. Mino TAYLOR  She goes to the [facility] lymphed[facility] for treatment, currently with a gauntlet and glove, for her left upper extremity. She had lymph node transfer surgery, 2018 by Dr. Jacob CONTRERAS- Unfortunately, no benefit. Consideration is being given to division of some of the latissimus dorsi muscle to allow for increased mobility of her upper extremity. 2021: Left axillary neurolysis by Dr. Deni LAI, with relief of symptoms  +FH Her father had melanoma. She has tested negative on the Melaris assay.   Her dermatologist is Dr. Chloe SY. 2023 visit is scheduled She used to see Dr. Mike Mccall,.   Her internist is Dr. Frank AMICO.  Pulmonary: Dr. Karuna KOENIG  No pacemaker or defibrillator. No anticoagulants.  Current medications:;.-  ProAir inhaler, recently diagnosed with COPD felt to be due to secondary smoke from her mother.   Her gynecologist is Dr. Lucia JACKSON 2021 visit ok.  Breast imaging is recorded below   Eye examination: 2023 visit is scheduled with Dr. Willi MELTON.   Colonoscopy: Dr. Earle PATEL. 2021 okay x3 years. She is aware that she is due for a follow-up visit in

## 2023-08-16 NOTE — REASON FOR VISIT
[Follow-Up Visit] : a follow-up visit for [Other: _____] : [unfilled] [FreeTextEntry2] : Stage III melanoma of the left chest, benign breast disease; breast cancer risk score = 20.7.

## 2023-08-16 NOTE — PHYSICAL EXAM
[Normal] : supple, no neck mass and thyroid not enlarged [Normal Neck Lymph Nodes] : normal neck lymph nodes  [Normal Supraclavicular Lymph Nodes] : normal supraclavicular lymph nodes [Normal Axillary Lymph Nodes] : normal axillary lymph nodes [Normal] : full range of motion and no deformities appreciated [de-identified] : Groins not examined [de-identified] : below

## 2023-08-16 NOTE — ASSESSMENT
[FreeTextEntry1] : Presents today concerned about a possible left axillary mass. My exam does not identify discrete abnormality. I have entered a prescription for a targeted left axillary sonogram. I have asked her to call me a week after the imaging to discuss the results.  If unremarkable, and she is still concerned about the left axilla, we will arrange for a follow-up breast MRI...............  January 2023: CT chest at 450: No suspicious findings, or interval change. Prescription entered for January 2024  Annual mammogram and breast ultrasound  October 2022, at 450: BI-RADS 2. Prescription entered for October 2023 January April 2022: Followup breast MRI, (breast cancer risk score 20.7): BI-RADS 2. We will repeat periodically balancing her increased risk of breast cancer against the concern of cumulative gadolinium exposure...............

## 2023-09-02 ENCOUNTER — NON-APPOINTMENT (OUTPATIENT)
Age: 68
End: 2023-09-02

## 2023-10-16 ENCOUNTER — NON-APPOINTMENT (OUTPATIENT)
Age: 68
End: 2023-10-16

## 2023-10-16 ENCOUNTER — OUTPATIENT (OUTPATIENT)
Dept: OUTPATIENT SERVICES | Facility: HOSPITAL | Age: 68
LOS: 1 days | End: 2023-10-16
Payer: COMMERCIAL

## 2023-10-16 ENCOUNTER — RESULT REVIEW (OUTPATIENT)
Age: 68
End: 2023-10-16

## 2023-10-16 ENCOUNTER — APPOINTMENT (OUTPATIENT)
Dept: ULTRASOUND IMAGING | Facility: IMAGING CENTER | Age: 68
End: 2023-10-16
Payer: COMMERCIAL

## 2023-10-16 ENCOUNTER — APPOINTMENT (OUTPATIENT)
Dept: MAMMOGRAPHY | Facility: IMAGING CENTER | Age: 68
End: 2023-10-16
Payer: COMMERCIAL

## 2023-10-16 DIAGNOSIS — Z98.890 OTHER SPECIFIED POSTPROCEDURAL STATES: Chronic | ICD-10-CM

## 2023-10-16 DIAGNOSIS — C43.59 MALIGNANT MELANOMA OF OTHER PART OF TRUNK: ICD-10-CM

## 2023-10-16 DIAGNOSIS — Z87.2 PERSONAL HISTORY OF DISEASES OF THE SKIN AND SUBCUTANEOUS TISSUE: Chronic | ICD-10-CM

## 2023-10-16 DIAGNOSIS — I89.9 NONINFECTIVE DISORDER OF LYMPHATIC VESSELS AND LYMPH NODES, UNSPECIFIED: Chronic | ICD-10-CM

## 2023-10-16 PROCEDURE — G0279: CPT | Mod: 26

## 2023-10-16 PROCEDURE — 77066 DX MAMMO INCL CAD BI: CPT

## 2023-10-16 PROCEDURE — G0279: CPT

## 2023-10-16 PROCEDURE — 76641 ULTRASOUND BREAST COMPLETE: CPT

## 2023-10-16 PROCEDURE — 76641 ULTRASOUND BREAST COMPLETE: CPT | Mod: 26,50

## 2023-10-16 PROCEDURE — 77066 DX MAMMO INCL CAD BI: CPT | Mod: 26

## 2023-11-18 NOTE — ASU PATIENT PROFILE, ADULT - PAIN DESCRIPTION (FREQUENCY/QUALITY), PROFILE
Reviewed discharge instructions, medication reconciliation, and patient education information with patient. Patient stated understanding, and answered questions to satisfaction. Patient verbalized satisfaction of care. PIV removed at this time. Patient ambulated safely to exit with a steady gait in no obvious acute distress. Patient verbalized understanding of returning to ER if symptoms worsen, and to follow up with primary health care provider.       Monica Deleon RN  11/18/23 0030 constant/spasm

## 2023-11-30 ENCOUNTER — APPOINTMENT (OUTPATIENT)
Dept: GASTROENTEROLOGY | Facility: CLINIC | Age: 68
End: 2023-11-30
Payer: COMMERCIAL

## 2023-11-30 VITALS
WEIGHT: 170 LBS | DIASTOLIC BLOOD PRESSURE: 100 MMHG | OXYGEN SATURATION: 99 % | HEIGHT: 70 IN | SYSTOLIC BLOOD PRESSURE: 190 MMHG | HEART RATE: 114 BPM | BODY MASS INDEX: 24.34 KG/M2

## 2023-11-30 DIAGNOSIS — Z12.11 ENCOUNTER FOR SCREENING FOR MALIGNANT NEOPLASM OF COLON: ICD-10-CM

## 2023-11-30 DIAGNOSIS — Z86.010 PERSONAL HISTORY OF COLONIC POLYPS: ICD-10-CM

## 2023-11-30 PROCEDURE — 99203 OFFICE O/P NEW LOW 30 MIN: CPT

## 2023-11-30 RX ORDER — SODIUM SULFATE, POTASSIUM SULFATE AND MAGNESIUM SULFATE 1.6; 3.13; 17.5 G/177ML; G/177ML; G/177ML
17.5-3.13-1.6 SOLUTION ORAL
Qty: 1 | Refills: 0 | Status: ACTIVE | COMMUNITY
Start: 2023-11-30 | End: 1900-01-01

## 2023-12-11 ENCOUNTER — TRANSCRIPTION ENCOUNTER (OUTPATIENT)
Age: 68
End: 2023-12-11

## 2024-01-08 ENCOUNTER — APPOINTMENT (OUTPATIENT)
Dept: GASTROENTEROLOGY | Facility: HOSPITAL | Age: 69
End: 2024-01-08

## 2024-01-08 ENCOUNTER — TRANSCRIPTION ENCOUNTER (OUTPATIENT)
Age: 69
End: 2024-01-08

## 2024-01-08 ENCOUNTER — RESULT REVIEW (OUTPATIENT)
Age: 69
End: 2024-01-08

## 2024-01-08 ENCOUNTER — OUTPATIENT (OUTPATIENT)
Dept: OUTPATIENT SERVICES | Facility: HOSPITAL | Age: 69
LOS: 1 days | End: 2024-01-08
Payer: COMMERCIAL

## 2024-01-08 VITALS
HEART RATE: 116 BPM | SYSTOLIC BLOOD PRESSURE: 138 MMHG | WEIGHT: 169.98 LBS | DIASTOLIC BLOOD PRESSURE: 94 MMHG | RESPIRATION RATE: 21 BRPM | HEIGHT: 70 IN | TEMPERATURE: 98 F | OXYGEN SATURATION: 100 %

## 2024-01-08 VITALS
HEART RATE: 85 BPM | RESPIRATION RATE: 20 BRPM | SYSTOLIC BLOOD PRESSURE: 173 MMHG | OXYGEN SATURATION: 97 % | DIASTOLIC BLOOD PRESSURE: 78 MMHG

## 2024-01-08 DIAGNOSIS — Z98.890 OTHER SPECIFIED POSTPROCEDURAL STATES: Chronic | ICD-10-CM

## 2024-01-08 DIAGNOSIS — I89.9 NONINFECTIVE DISORDER OF LYMPHATIC VESSELS AND LYMPH NODES, UNSPECIFIED: Chronic | ICD-10-CM

## 2024-01-08 DIAGNOSIS — Z87.2 PERSONAL HISTORY OF DISEASES OF THE SKIN AND SUBCUTANEOUS TISSUE: Chronic | ICD-10-CM

## 2024-01-08 DIAGNOSIS — Z86.010 PERSONAL HISTORY OF COLONIC POLYPS: ICD-10-CM

## 2024-01-08 PROCEDURE — 45385 COLONOSCOPY W/LESION REMOVAL: CPT | Mod: PT

## 2024-01-08 PROCEDURE — 88305 TISSUE EXAM BY PATHOLOGIST: CPT | Mod: 26

## 2024-01-08 PROCEDURE — 45385 COLONOSCOPY W/LESION REMOVAL: CPT

## 2024-01-08 PROCEDURE — 88305 TISSUE EXAM BY PATHOLOGIST: CPT

## 2024-01-08 DEVICE — NET RETRV ROT ROTH 2.5MMX230CM: Type: IMPLANTABLE DEVICE | Status: FUNCTIONAL

## 2024-01-08 RX ORDER — ACETAMINOPHEN 500 MG
2 TABLET ORAL
Refills: 0 | DISCHARGE

## 2024-01-08 NOTE — ASU PATIENT PROFILE, ADULT - FALL HARM RISK - FALLEN IN PAST
NEW URGENT [facility] REFERRAL     Patient is being referred to urology -    DX: Pelviectasis of kidney   Penile hypospadias    No

## 2024-01-08 NOTE — ASU DISCHARGE PLAN (ADULT/PEDIATRIC) - ASU DC SPECIAL INSTRUCTIONSFT
call [phone] in ONE week for pathology results call [phone] in ONE week for pathology results

## 2024-01-08 NOTE — ASU PREOP CHECKLIST - BLOOD AVAILABLE
06/27/20 1410   Quick Adds   Type of Visit Initial PT Evaluation   Living Environment   Lives With spouse   Living Arrangements house   Home Accessibility stairs to enter home   Number of Stairs, Main Entrance 2   Stair Railings, Main Entrance none   Transportation Anticipated family or friend will provide   Living Environment Comment Patient lives in a house with his wife (who is currently in rehab post stroke) and there are 2 streps to enter the home with no railing. Daughter lives in town and has been helping out.    Self-Care   Usual Activity Tolerance good   Current Activity Tolerance moderate   Equipment Currently Used at Home none  (owns walker from previous ankle break)   Activity/Exercise/Self-Care Comment At baseline patient is IND with functional mobility & ADLs.   Functional Level Prior   Ambulation 0-->independent   Transferring 0-->independent   Fall history within last six months no   Which of the above functional risks had a recent onset or change? ambulation;transferring   General Information   Onset of Illness/Injury or Date of Surgery - Date 06/26/20   Referring Physician Aidan Quintana MD   Patient/Family Goals Statement To return to home   Pertinent History of Current Problem (include personal factors and/or comorbidities that impact the POC) per chart: 78 year-old male with history of oxygen dependent COPD, dementia, gout, chronic lower extremity edema who presents with shortness of breath. Admitted on 6/24/2020 for COPD exacerbation.    Precautions/Limitations fall precautions   General Observations Greeted patient supine in bed with HOB elevated.    General Info Comments Activity: up with assist   Cognitive Status Examination   Orientation person;place   Level of Consciousness alert   Follows Commands and Answers Questions 100% of the time;able to follow single-step instructions   Cognitive Comment Hx of dementia, decreased safety awareness & poor understanding of overall  "situation/condition.   Pain Assessment   Patient Currently in Pain No   Integumentary/Edema   Integumentary/Edema Comments bruising throughout body   Posture    Posture Forward head position;Protracted shoulders   Range of Motion (ROM)   ROM Comment B LE WFL   Strength   Strength Comments B LE functionally weak - 3 reps on the chair stand test   Bed Mobility   Bed Mobility Comments supine <> EOB at SBA   Transfer Skills   Transfer Comments sit <> stand at CGA   Gait   Gait Comments 10ft with FWW at [facility], unsteady, scissoring of legs, decreased jodie/stride   Balance   Balance Comments unsteady with ambulation using FWW at CGA   Sensory Examination   Sensory Perception Comments occasional numbness in L toes   General Therapy Interventions   Planned Therapy Interventions balance training;gait training;neuromuscular re-education;strengthening;transfer training;home program guidelines;progressive activity/exercise   Clinical Impression   Criteria for Skilled Therapeutic Intervention yes, treatment indicated   PT Diagnosis impaired functional mobility   Influenced by the following impairments ALONSO/SOB, desats with activity when on 2L O2, LE weakness, impaired balance, decreased activity tolerance   Functional limitations due to impairments transfers, ambulation, stairs   Clinical Presentation Evolving/Changing   Clinical Presentation Rationale PMH, current presentation, clinical judgement   Clinical Decision Making (Complexity) Low complexity   Therapy Frequency Daily   Predicted Duration of Therapy Intervention (days/wks) 4 days   Anticipated Discharge Disposition Transitional Care Facility   Risk & Benefits of therapy have been explained Yes   Patient, Family & other staff in agreement with plan of care Yes   [facility] AM-PAC TM \"6 Clicks\"   2016, Trustees of [facility], under license to [employer].  All rights reserved.   6 Clicks Short Forms Basic Mobility Inpatient Short Form   [facility] " "AM-PAC  \"6 Clicks\" V.2 Basic Mobility Inpatient Short Form   1. Turning from your back to your side while in a flat bed without using bedrails? 4 - None   2. Moving from lying on your back to sitting on the side of a flat bed without using bedrails? 3 - A Little   3. Moving to and from a bed to a chair (including a wheelchair)? 3 - A Little   4. Standing up from a chair using your arms (e.g., wheelchair, or bedside chair)? 3 - A Little   5. To walk in hospital room? 3 - A Little   6. Climbing 3-5 steps with a railing? 2 - A Lot   Basic Mobility Raw Score (Score out of 24.Lower scores equate to lower levels of function) 18   Total Evaluation Time   Total Evaluation Time (Minutes) 10     " n/a

## 2024-01-08 NOTE — ASU PATIENT PROFILE, ADULT - TEACHING/LEARNING EDUCATIONAL LEVEL
Detail Level: Detailed Biopsy Photograph Reviewed: Yes Accession # (Optional): [id number] Number Of Curettages: 3 Size Of Lesion In Cm: 0.9 Size Of Lesion After Curettage: 1.2 Add Intralesional Injection: No Concentration (Mg/Ml Or Millions Of Plaque Forming Units/Cc): 0.01 Total Volume (Ccs): 1 Anesthesia Type: 1% lidocaine with epinephrine Cautery Type: electrodesiccation What Was Performed First?: Curettage Final Size Statement: The size of the lesion after curettage was Additional Information: (Optional): The wound was cleaned, and a medi honey dressing was applied. The patient received detailed post-op instructions. Consent was obtained from the patient. The risks, benefits and alternatives to therapy were discussed in detail. Specifically, the risks of infection, scarring, bleeding, prolonged wound healing, nerve injury, incomplete removal, allergy to anesthesia and recurrence were addressed. Alternatives to [facility], such as: surgical removal and XRT were also discussed. Prior to the procedure, the treatment site was clearly identified and confirmed by the patient. All components of Universal Protocol/PAUSE Rule completed. Post-Care Instructions: I reviewed with the patient in detail post-care instructions. Patient is to keep the area dry for 48 hours, and not to engage in any swimming until the area is healed. Should the patient develop any fevers, chills, bleeding, severe pain patient will contact the office immediately. Bill As A Line Item Or As Units: Line Item [facility]

## 2024-01-08 NOTE — ASU PATIENT PROFILE, ADULT - FALL HARM RISK - UNIVERSAL INTERVENTIONS
Bed in lowest position, wheels locked, appropriate side rails in place/Call bell, personal items and telephone in reach/Instruct patient to call for assistance before getting out of bed or chair/Non-slip footwear when patient is out of bed/[place] to call system/Physically safe environment - no spills, clutter or unnecessary equipment/Purposeful Proactive Rounding/Room/bathroom lighting operational, light cord in reach Bed in lowest position, wheels locked, appropriate side rails in place/Call bell, personal items and telephone in reach/Instruct patient to call for assistance before getting out of bed or chair/Non-slip footwear when patient is out of bed/[place] to call system/Physically safe environment - no spills, clutter or unnecessary equipment/Purposeful Proactive Rounding/Room/bathroom lighting operational, light cord in reach

## 2024-01-08 NOTE — ASU DISCHARGE PLAN (ADULT/PEDIATRIC) - NS MD DC FALL RISK RISK
For information on Fall & Injury Prevention, visit: [email or web address] OR  [email or web address] OR  https://www.cdc.gov/steadi/patient.html For information on Fall & Injury Prevention, visit: [email or web address] OR  [email or web address]-injury OR  https://www.cdc.gov/steadi/patient.html

## 2024-01-08 NOTE — PRE PROCEDURE NOTE - PRE PROCEDURE EVALUATION
Attending Physician:        Earle Albert MD                    Procedure:    Indication for Procedure: history of polyps  ________________________________________________________  PAST MEDICAL & SURGICAL HISTORY:  Melanoma  left side of chest 2010 /excision of lesion and lymph node excision with immunotherapy  2010      Lymphedema of arm  left 2010 post lymph node excision left axilla      History of COPD  secondary to second hand smoke exposure      Left breast mass  benign 2020      COVID-19  December 2020, mild symptoms and no hospitalization      COVID-19 vaccine series completed  moderna, second dose 2/9/21      Colon polyps  precancerous, last colonoscopy January 2021      Pain of left breast      H/O knee surgery  20 years ago, right      History of excision of lesion  left side of chest  melanoma 2010      Disorder of lymphatics  lymph node dissection left axilla 2010. 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 removal of clip left breast and reattachement of nerves.        ALLERGIES:  seaweed (Pruritus)  Mycobutin (Unknown)  adhesives (Rash)  Keflex (Rash)    HOME MEDICATIONS:    AICD/PPM: [ ] yes   [x ] no    PERTINENT LAB DATA:                      PHYSICAL EXAMINATION:    T(C): --  HR: --  BP: --  RR: --  SpO2: --    Constitutional: NAD  HEENT: PERRLA, EOMI,    Neck:  No JVD  Respiratory: CTAB/L  Cardiovascular: S1 and S2  Gastrointestinal: BS+, soft, NT/ND  Extremities: No peripheral edema  Neurological: A/O x 3, no focal deficits  Psychiatric: Normal mood, normal affect  Skin: No rashes    ASA Class: I [ ]  II [ x]  III [ ]  IV [ ]    COMMENTS:    The patient is a suitable candidate for the planned procedure unless box checked [ ]  No, explain:

## 2024-01-10 ENCOUNTER — NON-APPOINTMENT (OUTPATIENT)
Age: 69
End: 2024-01-10

## 2024-01-10 LAB
SURGICAL PATHOLOGY STUDY: SIGNIFICANT CHANGE UP
SURGICAL PATHOLOGY STUDY: SIGNIFICANT CHANGE UP

## 2024-01-31 ENCOUNTER — OUTPATIENT (OUTPATIENT)
Dept: OUTPATIENT SERVICES | Facility: HOSPITAL | Age: 69
LOS: 1 days | End: 2024-01-31
Payer: COMMERCIAL

## 2024-01-31 ENCOUNTER — APPOINTMENT (OUTPATIENT)
Dept: CT IMAGING | Facility: IMAGING CENTER | Age: 69
End: 2024-01-31
Payer: COMMERCIAL

## 2024-01-31 DIAGNOSIS — I89.9 NONINFECTIVE DISORDER OF LYMPHATIC VESSELS AND LYMPH NODES, UNSPECIFIED: Chronic | ICD-10-CM

## 2024-01-31 DIAGNOSIS — Z98.890 OTHER SPECIFIED POSTPROCEDURAL STATES: Chronic | ICD-10-CM

## 2024-01-31 DIAGNOSIS — Z87.2 PERSONAL HISTORY OF DISEASES OF THE SKIN AND SUBCUTANEOUS TISSUE: Chronic | ICD-10-CM

## 2024-01-31 DIAGNOSIS — C43.59 MALIGNANT MELANOMA OF OTHER PART OF TRUNK: ICD-10-CM

## 2024-01-31 PROCEDURE — 71250 CT THORAX DX C-: CPT

## 2024-01-31 PROCEDURE — 71250 CT THORAX DX C-: CPT | Mod: 26,MH

## 2024-02-12 ENCOUNTER — NON-APPOINTMENT (OUTPATIENT)
Age: 69
End: 2024-02-12

## 2024-02-12 ENCOUNTER — APPOINTMENT (OUTPATIENT)
Dept: SURGICAL ONCOLOGY | Facility: CLINIC | Age: 69
End: 2024-02-12
Payer: COMMERCIAL

## 2024-02-12 VITALS
OXYGEN SATURATION: 99 % | HEIGHT: 70 IN | DIASTOLIC BLOOD PRESSURE: 110 MMHG | BODY MASS INDEX: 24.34 KG/M2 | WEIGHT: 170 LBS | SYSTOLIC BLOOD PRESSURE: 214 MMHG | HEART RATE: 98 BPM

## 2024-02-12 DIAGNOSIS — C43.59 MALIGNANT MELANOMA OF OTHER PART OF TRUNK: ICD-10-CM

## 2024-02-12 PROCEDURE — 99215 OFFICE O/P EST HI 40 MIN: CPT

## 2024-02-13 NOTE — PHYSICAL EXAM
[Normal] : supple, no neck mass and thyroid not enlarged [Normal Supraclavicular Lymph Nodes] : normal supraclavicular lymph nodes [Normal Neck Lymph Nodes] : normal neck lymph nodes  [Normal Axillary Lymph Nodes] : normal axillary lymph nodes [Normal] : full range of motion and no deformities appreciated [de-identified] : Groins not examined [de-identified] : below

## 2024-02-13 NOTE — ASSESSMENT
[FreeTextEntry1] : 68-year-old lady.  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  Breast cancer risk score = 20.7.  Returns for interval examination today.  1/31/2024: CT chest at 450: No worrisome findings. No change in bilateral subcentimeter pulmonary nodules since January 2023.   October 2023: Bilateral mammogram and sonogram at 450: BI-RADS 2. Prescription entered today for October 2024.  April 2022: Breast MRI at 450: BI-RADS 2. Prescription entered today for April 2024.   In each instance I have asked her to call me a week after the imaging to discuss the results.  If asymptomatic, with a normal BSE, and breast imaging, I have offered to continue to see her annually, sooner if needed.  Reviewed in detail, all questions answered.

## 2024-02-13 NOTE — REASON FOR VISIT
[Follow-Up Visit] : a follow-up visit for [Other: _____] : [unfilled] [FreeTextEntry2] : Stage III melanoma of the left chest treated June 2010, interval breast exam, breast cancer risk score = 20.7.

## 2024-02-13 NOTE — HISTORY OF PRESENT ILLNESS
[de-identified] : 68-year-old lady.  Presents for follow-up of the following: Stage III melanoma of the left chest treated surgically in . Interval breast exam, breast cancer risk score = 20.7.  CC: Discomfort in the upper outer quadrant of the left breast where she had the previous resection (below). These are the same symptoms described below.  2023: She presented with a 2-month history of a painful swelling in the left axillary region which restricted her movement. No preceding injury or strain. No other specific or constitutional signs or symptoms. No provocative or palliative factors.   2020 when she had left axillary neurolysis surgery by Dr. Deni LAI, with relief of her discomfort.   + Prior personal history: 2019 had wire localized excision of a left axillary imaging abnormality, which on preoperative needle biopsy was benign but discordant.  Final pathology: Tissue, tail of left breast. Breast and fibroadipose tissue with prior procedures changes, fibrocystic change, usual ductal hyperplasia, sclerosing adenosis, apocrine metaplasia, PASH, and benign epithelial calcifications. Intramammary reactive lymph node with prior procedure site changes including cystic degeneration.  Accompanying skin was normal.  Oncoplastic closure: Dr. Riky DISLA + Chronic seroma. Surgical revision: 2020, with him, at [facility].  There was an ipsilateral, enlarging left breast nodule, which was benign and concordant on needle bx; one-year follow-up left breast ultrasound recommended.  There was also unexplained skin thickening which prompted the skin biopsy which accompanied the excisional biopsy described above - benign.   +FH: A sister had breast cancer in her 50s. Dec 2019 another sister dx with breast cancer @64.  Her mother had Ovarian cancer.  Her own genetic testing demonstrates NO deleterious mutation.   Other family history of malignancy: Father: Melanoma.   No other previous breast biopsies.   Menarche at 14  2, para 2, first at 30. Natural menopause at 52. No exogenous hormones   10/22/20: 2-week history of "bone pain" in left thigh. No preceding injury or strain. No provocative or palliative factors. Pain did not radiate.  Imaging 2020: Left femur x-ray, no suspicious findings. She still had pain in that extremity.  2020, bone scan: No evidence of metastatic disease.   History of stage III melanoma of the LEFT CHEST.  2018: cellulitic changes in the left upper extremity. Not preceded by any kind of injury. No constitutional signs or symptoms. Previous similar episodes were treated with Keflex, but now she is allergic. Treated with doxycycline.   2018, she reported some headaches and double vision. She had no other specific or constitutional neurologic signs or symptoms. 2018 brain MRI performed at our location demonstrated no abnormalities or interval changes.   2010: Wide excision of a 3.0 mm melanoma LEFT CHEST with negative margins.  Reconstruction was by Dr. Shaw KEY Single +SLN => a completion dissection which showed NO additional disease in 24 lymph nodes identified by the pathologist.  +Adjuvant interferon under the supervision of Dr. Bry MOORE.  Had left chest wall scar revision and fat-grafting by Dr. Mino TAYLOR  She goes to the [facility] for treatment, currently with a gauntlet and glove, for her left upper extremity. She had lymph node transfer surgery, 2018 by Dr. Jacob CONTRERAS- Unfortunately, no benefit.  2021: Left axillary neurolysis by Dr. Deni LAI, with relief of symptoms.   +FH Her father had melanoma. She has tested negative on the Melaris assay.   Her dermatologist is Dr. Chloe SY. 2023 visit was okay. She used to see Dr. Mike Mccall,   Her internist is Dr. Frank AMICO. He retired in . She will be arranging for a new internist.  Pulmonary: Dr. Karuna KOENIG.  + ALLERGIC: Keflex. Mycobutin.  No pacemaker or defibrillator. No anticoagulants.  Current medications: ProAir inhaler, + COPD felt to be due to secondary smoke from her mother.   Her gynecologist is Dr. Lucia JACKSON 2021 visit was ok. Assist with a follow-up visit.  Breast imaging is recorded below.   Eye examination: 2023 visit is scheduled with Dr. Willi MELTON.   Colonoscopy: Dr. Earle PATEL. 2024 okay x 5 years

## 2024-02-13 NOTE — REVIEW OF SYSTEMS
[Negative] : Endocrine [FreeTextEntry6] : COPD [FreeTextEntry7] : Allergic to Mycobutin [FreeTextEntry8] : Benign colonic polypectomy [de-identified] : Degenerative changes [de-identified] : Melanoma [FreeTextEntry1] : Increased risk of breast cancer

## 2024-04-09 NOTE — H&P PST ADULT - NS MD [facility] INPLANTS MED DEV
Fax received from [employer] on lab results for patient. Will send to scanning to be placed in patient chart.    dental implant

## 2024-04-15 ENCOUNTER — APPOINTMENT (OUTPATIENT)
Dept: MRI IMAGING | Facility: IMAGING CENTER | Age: 69
End: 2024-04-15
Payer: COMMERCIAL

## 2024-04-15 ENCOUNTER — OUTPATIENT (OUTPATIENT)
Dept: OUTPATIENT SERVICES | Facility: HOSPITAL | Age: 69
LOS: 1 days | End: 2024-04-15
Payer: COMMERCIAL

## 2024-04-15 DIAGNOSIS — C43.59 MALIGNANT MELANOMA OF OTHER PART OF TRUNK: ICD-10-CM

## 2024-04-15 DIAGNOSIS — Z98.890 OTHER SPECIFIED POSTPROCEDURAL STATES: Chronic | ICD-10-CM

## 2024-04-15 DIAGNOSIS — Z87.2 PERSONAL HISTORY OF DISEASES OF THE SKIN AND SUBCUTANEOUS TISSUE: Chronic | ICD-10-CM

## 2024-04-15 DIAGNOSIS — I89.9 NONINFECTIVE DISORDER OF LYMPHATIC VESSELS AND LYMPH NODES, UNSPECIFIED: Chronic | ICD-10-CM

## 2024-04-15 PROCEDURE — 77049 MRI BREAST C-+ W/CAD BI: CPT | Mod: 26

## 2024-04-15 PROCEDURE — C8908: CPT

## 2024-04-15 PROCEDURE — C8937: CPT

## 2024-04-15 PROCEDURE — A9585: CPT

## 2024-10-17 ENCOUNTER — APPOINTMENT (OUTPATIENT)
Dept: ULTRASOUND IMAGING | Facility: IMAGING CENTER | Age: 69
End: 2024-10-17
Payer: COMMERCIAL

## 2024-10-17 ENCOUNTER — OUTPATIENT (OUTPATIENT)
Dept: OUTPATIENT SERVICES | Facility: HOSPITAL | Age: 69
LOS: 1 days | End: 2024-10-17
Payer: COMMERCIAL

## 2024-10-17 ENCOUNTER — RESULT REVIEW (OUTPATIENT)
Age: 69
End: 2024-10-17

## 2024-10-17 ENCOUNTER — APPOINTMENT (OUTPATIENT)
Dept: MAMMOGRAPHY | Facility: IMAGING CENTER | Age: 69
End: 2024-10-17
Payer: COMMERCIAL

## 2024-10-17 DIAGNOSIS — I89.9 NONINFECTIVE DISORDER OF LYMPHATIC VESSELS AND LYMPH NODES, UNSPECIFIED: Chronic | ICD-10-CM

## 2024-10-17 DIAGNOSIS — C43.59 MALIGNANT MELANOMA OF OTHER PART OF TRUNK: ICD-10-CM

## 2024-10-17 DIAGNOSIS — Z87.2 PERSONAL HISTORY OF DISEASES OF THE SKIN AND SUBCUTANEOUS TISSUE: Chronic | ICD-10-CM

## 2024-10-17 DIAGNOSIS — Z98.890 OTHER SPECIFIED POSTPROCEDURAL STATES: Chronic | ICD-10-CM

## 2024-10-17 PROCEDURE — 76641 ULTRASOUND BREAST COMPLETE: CPT

## 2024-10-17 PROCEDURE — 77066 DX MAMMO INCL CAD BI: CPT | Mod: 26

## 2024-10-17 PROCEDURE — 77066 DX MAMMO INCL CAD BI: CPT

## 2024-10-17 PROCEDURE — 76641 ULTRASOUND BREAST COMPLETE: CPT | Mod: 26,50

## 2024-10-17 PROCEDURE — G0279: CPT

## 2024-10-17 PROCEDURE — G0279: CPT | Mod: 26

## 2024-10-28 ENCOUNTER — APPOINTMENT (OUTPATIENT)
Dept: SURGICAL ONCOLOGY | Facility: CLINIC | Age: 69
End: 2024-10-28
Payer: COMMERCIAL

## 2024-10-28 VITALS
HEIGHT: 70 IN | DIASTOLIC BLOOD PRESSURE: 110 MMHG | BODY MASS INDEX: 24.34 KG/M2 | WEIGHT: 170 LBS | SYSTOLIC BLOOD PRESSURE: 200 MMHG | OXYGEN SATURATION: 98 % | HEART RATE: 111 BPM

## 2024-10-28 DIAGNOSIS — C43.59 MALIGNANT MELANOMA OF OTHER PART OF TRUNK: ICD-10-CM

## 2024-10-28 PROCEDURE — 99215 OFFICE O/P EST HI 40 MIN: CPT

## 2025-01-24 ENCOUNTER — OUTPATIENT (OUTPATIENT)
Dept: OUTPATIENT SERVICES | Facility: HOSPITAL | Age: 70
LOS: 1 days | End: 2025-01-24
Payer: COMMERCIAL

## 2025-01-24 ENCOUNTER — APPOINTMENT (OUTPATIENT)
Dept: CT IMAGING | Facility: IMAGING CENTER | Age: 70
End: 2025-01-24

## 2025-01-24 DIAGNOSIS — Z98.890 OTHER SPECIFIED POSTPROCEDURAL STATES: Chronic | ICD-10-CM

## 2025-01-24 DIAGNOSIS — C43.59 MALIGNANT MELANOMA OF OTHER PART OF TRUNK: ICD-10-CM

## 2025-01-24 DIAGNOSIS — I89.9 NONINFECTIVE DISORDER OF LYMPHATIC VESSELS AND LYMPH NODES, UNSPECIFIED: Chronic | ICD-10-CM

## 2025-01-24 DIAGNOSIS — Z87.2 PERSONAL HISTORY OF DISEASES OF THE SKIN AND SUBCUTANEOUS TISSUE: Chronic | ICD-10-CM

## 2025-01-24 PROCEDURE — 71250 CT THORAX DX C-: CPT | Mod: 26

## 2025-01-24 PROCEDURE — 71250 CT THORAX DX C-: CPT

## (undated) DEVICE — BRUSH COLONOSCOPY CYTOLOGY

## (undated) DEVICE — SUCTION YANKAUER NO CONTROL VENT

## (undated) DEVICE — ENDOCUFF VISION SZ 2 LG GRN

## (undated) DEVICE — ELCTR GROUNDING PAD ADULT COVIDIEN

## (undated) DEVICE — SENSOR O2 FINGER ADULT

## (undated) DEVICE — FORCEP RADIAL JAW 4 JUMBO 2.8MM 3.2MM 240CM ORANGE DISP

## (undated) DEVICE — CATH IV SAFE BC 20G X 1.16" (PINK)

## (undated) DEVICE — POLY TRAP ETRAP

## (undated) DEVICE — TUBING SUCTION CONN 6FT STERILE

## (undated) DEVICE — FOLEY HOLDER STATLOCK 2 WAY ADULT

## (undated) DEVICE — PACK IV START WITH CHG

## (undated) DEVICE — TUBING IV SET GRAVITY 3Y 100" MACRO

## (undated) DEVICE — IRRIGATOR BIO SHIELD

## (undated) DEVICE — SNARE CAPTIVATOR COLD RND STIFF 10X2.4X2.8MM 240CM

## (undated) DEVICE — BIOPSY FORCEP RADIAL JAW 4 STANDARD WITH NEEDLE

## (undated) DEVICE — CLAMP BX HOT RAD JAW 3

## (undated) DEVICE — CATH IV SAFE BC 22G X 1" (BLUE)

## (undated) DEVICE — TUBING SUCTION 20FT

## (undated) DEVICE — SOL INJ NS 0.9% 500ML 2 PORT

## (undated) DEVICE — SYR LUER LOK 50CC